# Patient Record
Sex: FEMALE | Race: OTHER | HISPANIC OR LATINO | Employment: PART TIME | URBAN - NONMETROPOLITAN AREA
[De-identification: names, ages, dates, MRNs, and addresses within clinical notes are randomized per-mention and may not be internally consistent; named-entity substitution may affect disease eponyms.]

---

## 2018-01-12 ENCOUNTER — TRANSCRIBE ORDERS (OUTPATIENT)
Dept: ADMINISTRATIVE | Facility: HOSPITAL | Age: 58
End: 2018-01-12

## 2018-01-12 DIAGNOSIS — Z12.31 VISIT FOR SCREENING MAMMOGRAM: Primary | ICD-10-CM

## 2018-01-16 ENCOUNTER — TRANSCRIBE ORDERS (OUTPATIENT)
Dept: ADMINISTRATIVE | Facility: HOSPITAL | Age: 58
End: 2018-01-16

## 2018-01-16 DIAGNOSIS — Z12.39 SCREENING BREAST EXAMINATION: Primary | ICD-10-CM

## 2019-01-19 ENCOUNTER — HOSPITAL ENCOUNTER (EMERGENCY)
Facility: HOSPITAL | Age: 59
Discharge: HOME | End: 2019-01-19
Attending: EMERGENCY MEDICINE | Admitting: NURSE PRACTITIONER

## 2019-01-19 ENCOUNTER — APPOINTMENT (OUTPATIENT)
Dept: GENERAL RADIOLOGY | Facility: HOSPITAL | Age: 59
End: 2019-01-19

## 2019-01-19 VITALS
OXYGEN SATURATION: 95 % | SYSTOLIC BLOOD PRESSURE: 163 MMHG | DIASTOLIC BLOOD PRESSURE: 94 MMHG | WEIGHT: 175 LBS | TEMPERATURE: 98.8 F | HEIGHT: 60 IN | RESPIRATION RATE: 18 BRPM | HEART RATE: 84 BPM | BODY MASS INDEX: 34.36 KG/M2

## 2019-01-19 DIAGNOSIS — S52.571A OTHER CLOSED INTRA-ARTICULAR FRACTURE OF DISTAL END OF RIGHT RADIUS, INITIAL ENCOUNTER: Primary | ICD-10-CM

## 2019-01-19 PROCEDURE — 99283 EMERGENCY DEPT VISIT LOW MDM: CPT

## 2019-01-19 PROCEDURE — 73110 X-RAY EXAM OF WRIST: CPT

## 2019-01-19 PROCEDURE — 25010000002 MORPHINE PER 10 MG: Performed by: EMERGENCY MEDICINE

## 2019-01-19 PROCEDURE — 96372 THER/PROPH/DIAG INJ SC/IM: CPT

## 2019-01-19 PROCEDURE — 73110 X-RAY EXAM OF WRIST: CPT | Performed by: RADIOLOGY

## 2019-01-19 RX ORDER — ONDANSETRON 4 MG/1
4 TABLET, ORALLY DISINTEGRATING ORAL ONCE
Status: COMPLETED | OUTPATIENT
Start: 2019-01-19 | End: 2019-01-19

## 2019-01-19 RX ORDER — HYDROCODONE BITARTRATE AND ACETAMINOPHEN 5; 325 MG/1; MG/1
1 TABLET ORAL ONCE
Status: COMPLETED | OUTPATIENT
Start: 2019-01-19 | End: 2019-01-19

## 2019-01-19 RX ORDER — HYDROCODONE BITARTRATE AND ACETAMINOPHEN 5; 325 MG/1; MG/1
1 TABLET ORAL EVERY 6 HOURS PRN
Qty: 10 TABLET | Refills: 0 | Status: SHIPPED | OUTPATIENT
Start: 2019-01-19 | End: 2019-04-24

## 2019-01-19 RX ADMIN — HYDROCODONE BITARTRATE AND ACETAMINOPHEN 1 TABLET: 5; 325 TABLET ORAL at 23:17

## 2019-01-19 RX ADMIN — ONDANSETRON 4 MG: 4 TABLET, ORALLY DISINTEGRATING ORAL at 22:17

## 2019-01-19 RX ADMIN — MORPHINE SULFATE 4 MG: 4 INJECTION INTRAVENOUS at 22:17

## 2019-01-20 NOTE — ED PROVIDER NOTES
Subjective     History provided by:  Patient   used: No    Fall   Mechanism of injury: fall    Injury location:  Shoulder/arm  Shoulder/arm injury location:  R wrist  [place] airport.  Time since incident:  3 hours  Arrived directly from scene: no    Fall:     Fall occurred: escalator.    Impact surface:  Hard floor    Point of impact:  Hands  Suspicion of alcohol use: no    Suspicion of drug use: no    Tetanus status:  Unknown  Prior to arrival data:     Bystander interventions:  None    Patient ambulatory at scene: yes      Blood loss:  None    Responsiveness at scene:  Alert    Orientation at scene:  Person, place and situation    Loss of consciousness: no      Amnesic to event: no      Airway interventions:  None    Breathing interventions:  None  Associated symptoms: no abdominal pain, no back pain, no difficulty breathing, no headaches, no nausea and no neck pain    Risk factors: no AICD, no anticoagulation therapy, no kidney disease and no past MI        Review of Systems   Constitutional: Negative.    HENT: Negative.    Eyes: Negative.    Respiratory: Negative.    Cardiovascular: Negative.    Gastrointestinal: Negative for abdominal pain and nausea.   Endocrine: Negative.    Genitourinary: Negative.    Musculoskeletal: Negative for back pain and neck pain.   Skin: Negative.    Allergic/Immunologic: Negative.    Neurological: Negative for headaches.   Hematological: Negative.    Psychiatric/Behavioral: Negative.    All other systems reviewed and are negative.      Past Medical History:   Diagnosis Date   • Arthritis    • Carpal tunnel syndrome    • Hypertension    • PONV (postoperative nausea and vomiting)    • Rectal bleeding    • Tendonitis        Allergies   Allergen Reactions   • Lodine [Etodolac]        Past Surgical History:   Procedure Laterality Date   • COLONOSCOPY  2011   • COLONOSCOPY N/A 9/6/2016    Procedure: COLONOSCOPY  CPTCODE: 91021;  Surgeon: Daniel Stephens  Mely CARDENAS MD;  Location: [facility] OR;  Service:    • FOOT SURGERY     • HYSTERECTOMY      43   • KNEE SURGERY         Family History   Problem Relation Age of Onset   • Arthritis Mother    • Hypertension Mother    • Heart disease Father    • Diabetes Father        Social History     Socioeconomic History   • Marital status:      Spouse name: Not on file   • Number of children: Not on file   • Years of education: Not on file   • Highest education level: Not on file   Tobacco Use   • Smoking status: Former Smoker     Last attempt to quit:      Years since quittin.0   • Smokeless tobacco: Never Used   Substance and Sexual Activity   • Alcohol use: No   • Drug use: No   • Sexual activity: Defer           Objective   Physical Exam   Constitutional: She is oriented to person, place, and time. She appears well-developed and well-nourished.   HENT:   Head: Normocephalic.   Eyes: EOM are normal. Pupils are equal, round, and reactive to light.   Neck: Normal range of motion. Neck supple.   Cardiovascular: Normal rate, regular rhythm, normal heart sounds and intact distal pulses.   Pulmonary/Chest: Effort normal and breath sounds normal.   Abdominal: Soft. Bowel sounds are normal.   Musculoskeletal: She exhibits tenderness and deformity.   Deformity right wrist. Tenderness noted throughout wrist. Swelling noted. Distal pulses intact   Neurological: She is alert and oriented to person, place, and time.   Skin: Skin is warm and dry. Capillary refill takes less than 2 seconds.   Psychiatric: She has a normal mood and affect. Her behavior is normal. Judgment and thought content normal.   Nursing note and vitals reviewed.      Procedures           ED Course  ED Course as of 2259   Sat 2019   2251 Intraarticular radius fx with displacement of radial head. XR Wrist 3+ View Right [KK]    [facility] [id number] 0 active morphine equivalent  [KK]      ED Course User Index  [KK] Soni Foster, APRN                   MDM  Number of Diagnoses or Management Options  Other closed intra-articular fracture of distal end of right radius, initial encounter: new and requires workup     Amount and/or Complexity of Data Reviewed  Tests in the radiology section of CPT®: reviewed and ordered    Risk of Complications, Morbidity, and/or Mortality  Presenting problems: moderate  Diagnostic procedures: moderate  Management options: moderate    Patient Progress  Patient progress: improved        Final diagnoses:   Other closed intra-articular fracture of distal end of right radius, initial encounter            Soni Foster, APRN  01/19/19 [id number]

## 2019-01-20 NOTE — ED NOTES
Pt awaiting ride from sister. Sister has been instructed to come inside and sign pt's paperwork  As the .     Cindi Gandhi RN  01/19/19 5243

## 2019-01-21 ENCOUNTER — OFFICE VISIT (OUTPATIENT)
Dept: ORTHOPEDIC SURGERY | Facility: CLINIC | Age: 59
End: 2019-01-21

## 2019-01-21 ENCOUNTER — APPOINTMENT (OUTPATIENT)
Dept: PREADMISSION TESTING | Facility: HOSPITAL | Age: 59
End: 2019-01-21

## 2019-01-21 ENCOUNTER — TELEPHONE (OUTPATIENT)
Dept: ORTHOPEDIC SURGERY | Facility: CLINIC | Age: 59
End: 2019-01-21

## 2019-01-21 VITALS
HEIGHT: 60 IN | BODY MASS INDEX: 34.95 KG/M2 | HEART RATE: 80 BPM | SYSTOLIC BLOOD PRESSURE: 160 MMHG | DIASTOLIC BLOOD PRESSURE: 100 MMHG | WEIGHT: 178 LBS

## 2019-01-21 DIAGNOSIS — S52.351A CLOSED DISPLACED COMMINUTED FRACTURE OF SHAFT OF RIGHT RADIUS, INITIAL ENCOUNTER: ICD-10-CM

## 2019-01-21 DIAGNOSIS — S52.351A CLOSED DISPLACED COMMINUTED FRACTURE OF SHAFT OF RIGHT RADIUS, INITIAL ENCOUNTER: Primary | ICD-10-CM

## 2019-01-21 LAB
ANION GAP SERPL CALCULATED.3IONS-SCNC: 6.7 MMOL/L (ref 3.6–11.2)
BUN BLD-MCNC: 12 MG/DL (ref 7–21)
BUN/CREAT SERPL: 18.2 (ref 7–25)
CALCIUM SPEC-SCNC: 9.8 MG/DL (ref 7.7–10)
CHLORIDE SERPL-SCNC: 104 MMOL/L (ref 99–112)
CO2 SERPL-SCNC: 27.3 MMOL/L (ref 24.3–31.9)
CREAT BLD-MCNC: 0.66 MG/DL (ref 0.43–1.29)
DEPRECATED RDW RBC AUTO: 44.8 FL (ref 37–54)
ERYTHROCYTE [DISTWIDTH] IN BLOOD BY AUTOMATED COUNT: 14.7 % (ref 11.5–14.5)
GFR SERPL CREATININE-BSD FRML MDRD: 112 ML/MIN/1.73
GFR SERPL CREATININE-BSD FRML MDRD: 92 ML/MIN/1.73
GLUCOSE BLD-MCNC: 112 MG/DL (ref 70–110)
HCT VFR BLD AUTO: 41 % (ref 37–47)
HGB BLD-MCNC: 13.9 G/DL (ref 12–16)
MCH RBC QN AUTO: 28.7 PG (ref 27–33)
MCHC RBC AUTO-ENTMCNC: 33.9 G/DL (ref 33–37)
MCV RBC AUTO: 84.7 FL (ref 80–94)
OSMOLALITY SERPL CALC.SUM OF ELEC: 276.2 MOSM/KG (ref 273–305)
PLATELET # BLD AUTO: 204 10*3/MM3 (ref 130–400)
PMV BLD AUTO: 11.8 FL (ref 6–10)
POTASSIUM BLD-SCNC: 3.3 MMOL/L (ref 3.5–5.3)
RBC # BLD AUTO: 4.84 10*6/MM3 (ref 4.2–5.4)
SODIUM BLD-SCNC: 138 MMOL/L (ref 135–153)
WBC NRBC COR # BLD: 5.7 10*3/MM3 (ref 4.5–12.5)

## 2019-01-21 PROCEDURE — 80048 BASIC METABOLIC PNL TOTAL CA: CPT | Performed by: ANESTHESIOLOGY

## 2019-01-21 PROCEDURE — 85027 COMPLETE CBC AUTOMATED: CPT | Performed by: ANESTHESIOLOGY

## 2019-01-21 PROCEDURE — 36415 COLL VENOUS BLD VENIPUNCTURE: CPT

## 2019-01-21 PROCEDURE — 99204 OFFICE O/P NEW MOD 45 MIN: CPT | Performed by: ORTHOPAEDIC SURGERY

## 2019-01-21 RX ORDER — CEFAZOLIN SODIUM 2 G/50ML
2 SOLUTION INTRAVENOUS ONCE
Status: CANCELLED | OUTPATIENT
Start: 2019-01-22 | End: 2019-01-21

## 2019-01-21 NOTE — PROGRESS NOTES
History & Physical      Patient: Bia Mott  YOB: 1960  Date of Encounter: 01/21/2019        Chief Complaint:   Chief Complaint   Patient presents with   • Right Wrist - Pain, Edema       HPI:   Bia Mott, 58 y.o. female, referred by [facility] presents for evaluation of right wrist injury sustained 2 days ago.  [place] airport[place]  She presented to the emergency room and was placed in a splint.  She has had no previous injuries to her right wrist.  She is left-hand dominant.    Active Problem List:  Patient Active Problem List   Diagnosis   • Closed displaced comminuted fracture of shaft of right radius   • Hypertension       Past Medical History:  Past Medical History:   Diagnosis Date   • Arthritis    • Carpal tunnel syndrome    • Hypertension    • Radius distal fracture    • Rectal bleeding    • Tendonitis        Past Surgical History:  Past Surgical History:   Procedure Laterality Date   • COLONOSCOPY  2011   • COLONOSCOPY N/A 9/6/2016    Procedure: COLONOSCOPY  CPTCODE: 08832;  Surgeon: Daniel Boone III, MD;  Location: [facility];  Service:    • FOOT SURGERY     • HYSTERECTOMY      43   • KNEE SURGERY         Family History:  Family History   Problem Relation Age of Onset   • Arthritis Mother    • Hypertension Mother    • Heart disease Father    • Diabetes Father    • Hypertension Father    • Hypertension Sister    • Heart disease Brother    • Hypertension Brother        Social History:  Social History     Socioeconomic History   • Marital status: Legally      Spouse name: Not on file   • Number of children: Not on file   • Years of education: Not on file   • Highest education level: Not on file   Social Needs   • Financial resource strain: Not on file   • Food insecurity - worry: Not on file   • Food insecurity - inability: Not on file   • Transportation needs - medical: Not on file   • Transportation needs - non-medical: Not on  file   Occupational History   • Not on file   Tobacco Use   • Smoking status: Former Smoker     Last attempt to quit: 2015     Years since quittin.0   • Smokeless tobacco: Never Used   Substance and Sexual Activity   • Alcohol use: No   • Drug use: No   • Sexual activity: Defer   Other Topics Concern   • Not on file   Social History Narrative   • Not on file     Patient's Body mass index is 34.76 kg/m². BMI is above normal parameters. Recommendations include: educational material.      Medications:  Current Outpatient Medications   Medication Sig Dispense Refill   • ALLER-EASE 180 MG tablet daily.     • HYDROcodone-acetaminophen (NORCO) 5-325 MG per tablet Take 1 tablet by mouth Every 6 (Six) Hours As Needed for Moderate Pain . 10 tablet 0   • lisinopril-hydrochlorothiazide (PRINZIDE,ZESTORETIC) 10-12.5 MG per tablet daily.     • montelukast (SINGULAIR) 10 MG tablet daily.     • vitamin D (ERGOCALCIFEROL) 50422 UNITS capsule capsule 1 (one) time per week.       No current facility-administered medications for this visit.        Allergies:  Allergies   Allergen Reactions   • Lodine [Etodolac]        Review of Systems:   Review of Systems   Constitutional: Positive for activity change.   HENT: Negative.    Eyes: Negative.    Respiratory: Negative.    Cardiovascular: Negative.    Gastrointestinal: Negative.    Endocrine: Positive for cold intolerance and heat intolerance.   Genitourinary: Negative.    Musculoskeletal: Positive for arthralgias and joint swelling.   Skin: Negative.    Allergic/Immunologic: Negative.    Neurological: Negative.    Hematological: Negative.    Psychiatric/Behavioral: Negative.        Physical Exam:   Physical Exam   Constitutional: She is oriented to person, place, and time. She appears well-developed. No distress.   HENT:   Head: Normocephalic and atraumatic.   Right Ear: External ear normal.   Left Ear: External ear normal.   Nose: Nose normal.   Eyes: Conjunctivae and EOM are normal.  "Right eye exhibits no discharge. Left eye exhibits no discharge.   Neck: Normal range of motion. Neck supple.   Cardiovascular: Normal rate, regular rhythm, normal heart sounds and intact distal pulses.   No murmur heard.  Pulmonary/Chest: Effort normal and breath sounds normal. No respiratory distress. She has no wheezes.   Abdominal: Soft. She exhibits no distension.   Musculoskeletal:   Musculoskeletal: Right wrist evaluation reveals generalized swelling about the distal radius and proximal hand.  She has good pulse, good sensation to her fingers and active motion to all fingers.   Neurological: She is alert and oriented to person, place, and time.   Skin: Skin is warm and dry. Capillary refill takes less than 2 seconds. She is not diaphoretic.   Psychiatric: She has a normal mood and affect. Her behavior is normal. Judgment and thought content normal.     GENERAL: 58 y.o. female, alert and oriented X 3 in no acute distress.   Visit Vitals  /100 (BP Location: Left arm, Patient Position: Sitting)   Pulse 80   Ht 152.4 cm (60\")   Wt 80.7 kg (178 lb)   BMI 34.76 kg/m²       Radiology/Labs:   Radiographs right wrist show fracture the distal radius 100% displaced dorsally.  There is intra-articular component to the fracture nondisplaced.       Assessment & Plan:   58 y.o. female with comminuted intra-articular fracture right distal radius.  We discussed her options which include closed reduction versus open reduction internal fixation with volar plate.  After that discussion she wishes to proceed with surgical fixation of her wrist, she will be scheduled for open reduction internal fixation right distal radius January 22, 2019.  Today she is placed in a fiberglass splint.      ICD-10-CM ICD-9-CM   1. Closed displaced comminuted fracture of shaft of right radius, initial encounter S52.351A 813.21               Cc:   Rubia Brandon MD          Scribed for Cristo Villeda MD by Jane Villeda " RN.10:34 AM 01/21/2019

## 2019-01-22 ENCOUNTER — ANESTHESIA EVENT (OUTPATIENT)
Dept: PERIOP | Facility: HOSPITAL | Age: 59
End: 2019-01-22

## 2019-01-22 ENCOUNTER — ANESTHESIA (OUTPATIENT)
Dept: PERIOP | Facility: HOSPITAL | Age: 59
End: 2019-01-22

## 2019-01-22 ENCOUNTER — HOSPITAL ENCOUNTER (OUTPATIENT)
Facility: HOSPITAL | Age: 59
Setting detail: HOSPITAL OUTPATIENT SURGERY
Discharge: HOME | End: 2019-01-22
Attending: ORTHOPAEDIC SURGERY | Admitting: ORTHOPAEDIC SURGERY

## 2019-01-22 ENCOUNTER — APPOINTMENT (OUTPATIENT)
Dept: GENERAL RADIOLOGY | Facility: HOSPITAL | Age: 59
End: 2019-01-22

## 2019-01-22 ENCOUNTER — PREP FOR SURGERY (OUTPATIENT)
Dept: OTHER | Facility: HOSPITAL | Age: 59
End: 2019-01-22

## 2019-01-22 VITALS
WEIGHT: 178 LBS | OXYGEN SATURATION: 98 % | HEIGHT: 60 IN | TEMPERATURE: 97.2 F | BODY MASS INDEX: 34.95 KG/M2 | RESPIRATION RATE: 15 BRPM | SYSTOLIC BLOOD PRESSURE: 156 MMHG | HEART RATE: 90 BPM | DIASTOLIC BLOOD PRESSURE: 88 MMHG

## 2019-01-22 DIAGNOSIS — S52.351A CLOSED DISPLACED COMMINUTED FRACTURE OF SHAFT OF RIGHT RADIUS, INITIAL ENCOUNTER: ICD-10-CM

## 2019-01-22 PROBLEM — I10 HYPERTENSION: Status: ACTIVE | Noted: 2019-01-22

## 2019-01-22 PROCEDURE — 25010000003 CEFAZOLIN SODIUM-DEXTROSE 2-3 GM-%(50ML) RECONSTITUTED SOLUTION: Performed by: ORTHOPAEDIC SURGERY

## 2019-01-22 PROCEDURE — C1713 ANCHOR/SCREW BN/BN,TIS/BN: HCPCS | Performed by: ORTHOPAEDIC SURGERY

## 2019-01-22 PROCEDURE — 25010000002 PROPOFOL 10 MG/ML EMULSION: Performed by: NURSE ANESTHETIST, CERTIFIED REGISTERED

## 2019-01-22 PROCEDURE — 25010000002 MIDAZOLAM PER 1 MG: Performed by: NURSE ANESTHETIST, CERTIFIED REGISTERED

## 2019-01-22 PROCEDURE — 94799 UNLISTED PULMONARY SVC/PX: CPT

## 2019-01-22 PROCEDURE — 25010000002 FENTANYL CITRATE (PF) 100 MCG/2ML SOLUTION: Performed by: NURSE ANESTHETIST, CERTIFIED REGISTERED

## 2019-01-22 PROCEDURE — 25010000002 PROPOFOL 10 MG/ML EMULSION

## 2019-01-22 PROCEDURE — 25609 OPTX DST RD XART FX/EP SEP3+: CPT | Performed by: ORTHOPAEDIC SURGERY

## 2019-01-22 PROCEDURE — 25010000002 ONDANSETRON PER 1 MG

## 2019-01-22 PROCEDURE — 25010000002 ONDANSETRON PER 1 MG: Performed by: NURSE ANESTHETIST, CERTIFIED REGISTERED

## 2019-01-22 PROCEDURE — 76000 FLUOROSCOPY <1 HR PHYS/QHP: CPT | Performed by: RADIOLOGY

## 2019-01-22 PROCEDURE — 76000 FLUOROSCOPY <1 HR PHYS/QHP: CPT

## 2019-01-22 PROCEDURE — 25010000002 MIDAZOLAM PER 1 MG

## 2019-01-22 PROCEDURE — 25010000002 FENTANYL CITRATE (PF) 100 MCG/2ML SOLUTION

## 2019-01-22 DEVICE — IMPLANTABLE DEVICE: Type: IMPLANTABLE DEVICE | Site: RADIUS | Status: FUNCTIONAL

## 2019-01-22 DEVICE — SCRW LK VA W STRDRV 2.4X18MM: Type: IMPLANTABLE DEVICE | Site: RADIUS | Status: FUNCTIONAL

## 2019-01-22 DEVICE — SCRW CORT S/TAP STRDRV 2.7X12MM: Type: IMPLANTABLE DEVICE | Site: RADIUS | Status: FUNCTIONAL

## 2019-01-22 DEVICE — SCRW LK VA W STRDRV 2.4X20MM: Type: IMPLANTABLE DEVICE | Site: RADIUS | Status: FUNCTIONAL

## 2019-01-22 RX ORDER — SODIUM CHLORIDE 0.9 % (FLUSH) 0.9 %
3 SYRINGE (ML) INJECTION EVERY 12 HOURS SCHEDULED
Status: DISCONTINUED | OUTPATIENT
Start: 2019-01-22 | End: 2019-01-22 | Stop reason: HOSPADM

## 2019-01-22 RX ORDER — OXYCODONE HYDROCHLORIDE AND ACETAMINOPHEN 5; 325 MG/1; MG/1
1 TABLET ORAL ONCE AS NEEDED
Status: COMPLETED | OUTPATIENT
Start: 2019-01-22 | End: 2019-01-22

## 2019-01-22 RX ORDER — OXYCODONE HYDROCHLORIDE AND ACETAMINOPHEN 5; 325 MG/1; MG/1
1-2 TABLET ORAL EVERY 4 HOURS PRN
Qty: 30 TABLET | Refills: 0 | Status: SHIPPED | OUTPATIENT
Start: 2019-01-22 | End: 2019-04-24

## 2019-01-22 RX ORDER — BUPIVACAINE HYDROCHLORIDE 5 MG/ML
INJECTION, SOLUTION PERINEURAL AS NEEDED
Status: DISCONTINUED | OUTPATIENT
Start: 2019-01-22 | End: 2019-01-22 | Stop reason: HOSPADM

## 2019-01-22 RX ORDER — SODIUM CHLORIDE 0.9 % (FLUSH) 0.9 %
3-10 SYRINGE (ML) INJECTION AS NEEDED
Status: DISCONTINUED | OUTPATIENT
Start: 2019-01-22 | End: 2019-01-22 | Stop reason: HOSPADM

## 2019-01-22 RX ORDER — FENTANYL CITRATE 50 UG/ML
50 INJECTION, SOLUTION INTRAMUSCULAR; INTRAVENOUS
Status: COMPLETED | OUTPATIENT
Start: 2019-01-22 | End: 2019-01-22

## 2019-01-22 RX ORDER — ONDANSETRON 2 MG/ML
4 INJECTION INTRAMUSCULAR; INTRAVENOUS ONCE AS NEEDED
Status: DISCONTINUED | OUTPATIENT
Start: 2019-01-22 | End: 2019-01-22 | Stop reason: HOSPADM

## 2019-01-22 RX ORDER — IPRATROPIUM BROMIDE AND ALBUTEROL SULFATE 2.5; .5 MG/3ML; MG/3ML
3 SOLUTION RESPIRATORY (INHALATION) ONCE AS NEEDED
Status: DISCONTINUED | OUTPATIENT
Start: 2019-01-22 | End: 2019-01-22 | Stop reason: HOSPADM

## 2019-01-22 RX ORDER — FAMOTIDINE 10 MG/ML
INJECTION, SOLUTION INTRAVENOUS AS NEEDED
Status: DISCONTINUED | OUTPATIENT
Start: 2019-01-22 | End: 2019-01-22 | Stop reason: SURG

## 2019-01-22 RX ORDER — FENTANYL CITRATE 50 UG/ML
INJECTION, SOLUTION INTRAMUSCULAR; INTRAVENOUS AS NEEDED
Status: DISCONTINUED | OUTPATIENT
Start: 2019-01-22 | End: 2019-01-22 | Stop reason: SURG

## 2019-01-22 RX ORDER — LIDOCAINE HYDROCHLORIDE 20 MG/ML
INJECTION, SOLUTION INFILTRATION; PERINEURAL AS NEEDED
Status: DISCONTINUED | OUTPATIENT
Start: 2019-01-22 | End: 2019-01-22 | Stop reason: SURG

## 2019-01-22 RX ORDER — MEPERIDINE HYDROCHLORIDE 25 MG/ML
12.5 INJECTION INTRAMUSCULAR; INTRAVENOUS; SUBCUTANEOUS
Status: DISCONTINUED | OUTPATIENT
Start: 2019-01-22 | End: 2019-01-22 | Stop reason: HOSPADM

## 2019-01-22 RX ORDER — SODIUM CHLORIDE, SODIUM LACTATE, POTASSIUM CHLORIDE, CALCIUM CHLORIDE 600; 310; 30; 20 MG/100ML; MG/100ML; MG/100ML; MG/100ML
125 INJECTION, SOLUTION INTRAVENOUS CONTINUOUS
Status: DISCONTINUED | OUTPATIENT
Start: 2019-01-22 | End: 2019-01-22 | Stop reason: HOSPADM

## 2019-01-22 RX ORDER — CEFAZOLIN SODIUM 2 G/50ML
2 SOLUTION INTRAVENOUS ONCE
Status: COMPLETED | OUTPATIENT
Start: 2019-01-22 | End: 2019-01-22

## 2019-01-22 RX ORDER — ONDANSETRON 2 MG/ML
INJECTION INTRAMUSCULAR; INTRAVENOUS AS NEEDED
Status: DISCONTINUED | OUTPATIENT
Start: 2019-01-22 | End: 2019-01-22 | Stop reason: SURG

## 2019-01-22 RX ORDER — MIDAZOLAM HYDROCHLORIDE 1 MG/ML
INJECTION INTRAMUSCULAR; INTRAVENOUS AS NEEDED
Status: DISCONTINUED | OUTPATIENT
Start: 2019-01-22 | End: 2019-01-22 | Stop reason: SURG

## 2019-01-22 RX ORDER — PROPOFOL 10 MG/ML
VIAL (ML) INTRAVENOUS AS NEEDED
Status: DISCONTINUED | OUTPATIENT
Start: 2019-01-22 | End: 2019-01-22 | Stop reason: SURG

## 2019-01-22 RX ADMIN — CEFAZOLIN SODIUM 2 G: 2 SOLUTION INTRAVENOUS at 10:27

## 2019-01-22 RX ADMIN — EPHEDRINE SULFATE 10 MG: 50 INJECTION INTRAMUSCULAR; INTRAVENOUS; SUBCUTANEOUS at 11:26

## 2019-01-22 RX ADMIN — FENTANYL CITRATE 50 MCG: 50 INJECTION INTRAMUSCULAR; INTRAVENOUS at 12:08

## 2019-01-22 RX ADMIN — FENTANYL CITRATE 50 MCG: 50 INJECTION INTRAMUSCULAR; INTRAVENOUS at 10:32

## 2019-01-22 RX ADMIN — EPHEDRINE SULFATE 5 MG: 50 INJECTION INTRAMUSCULAR; INTRAVENOUS; SUBCUTANEOUS at 11:31

## 2019-01-22 RX ADMIN — FAMOTIDINE 20 MG: 10 INJECTION, SOLUTION INTRAVENOUS at 10:27

## 2019-01-22 RX ADMIN — OXYCODONE HYDROCHLORIDE AND ACETAMINOPHEN 1 TABLET: 5; 325 TABLET ORAL at 12:43

## 2019-01-22 RX ADMIN — LIDOCAINE HYDROCHLORIDE 30 MG: 20 INJECTION, SOLUTION INFILTRATION; PERINEURAL at 10:32

## 2019-01-22 RX ADMIN — EPHEDRINE SULFATE 10 MG: 50 INJECTION INTRAMUSCULAR; INTRAVENOUS; SUBCUTANEOUS at 10:40

## 2019-01-22 RX ADMIN — FENTANYL CITRATE 50 MCG: 50 INJECTION INTRAMUSCULAR; INTRAVENOUS at 10:40

## 2019-01-22 RX ADMIN — PROPOFOL 200 MG: 10 INJECTION, EMULSION INTRAVENOUS at 10:32

## 2019-01-22 RX ADMIN — SODIUM CHLORIDE, POTASSIUM CHLORIDE, SODIUM LACTATE AND CALCIUM CHLORIDE 125 ML/HR: 600; 310; 30; 20 INJECTION, SOLUTION INTRAVENOUS at 09:12

## 2019-01-22 RX ADMIN — MEPERIDINE HYDROCHLORIDE 12.5 MG: 25 INJECTION INTRAMUSCULAR; INTRAVENOUS; SUBCUTANEOUS at 12:23

## 2019-01-22 RX ADMIN — FENTANYL CITRATE 50 MCG: 50 INJECTION INTRAMUSCULAR; INTRAVENOUS at 12:13

## 2019-01-22 RX ADMIN — FENTANYL CITRATE 50 MCG: 50 INJECTION INTRAMUSCULAR; INTRAVENOUS at 11:52

## 2019-01-22 RX ADMIN — ONDANSETRON 4 MG: 2 INJECTION, SOLUTION INTRAMUSCULAR; INTRAVENOUS at 10:37

## 2019-01-22 RX ADMIN — MIDAZOLAM HYDROCHLORIDE 2 MG: 1 INJECTION, SOLUTION INTRAMUSCULAR; INTRAVENOUS at 10:27

## 2019-01-22 RX ADMIN — FENTANYL CITRATE 50 MCG: 50 INJECTION INTRAMUSCULAR; INTRAVENOUS at 10:59

## 2019-01-22 NOTE — ANESTHESIA POSTPROCEDURE EVALUATION
Patient: Bia Mott    Procedure Summary     Date:  01/22/19 Room / Location:  [facility] OR 03 /  [facility] OR    Anesthesia Start:  1027 Anesthesia Stop:  1150    Procedure:  OPEN REDUCTION INTERNAL FIXATION DISTAL RADIUS (Right Hand) Diagnosis:       Closed displaced comminuted fracture of shaft of right radius, initial encounter      (Closed displaced comminuted fracture of shaft of right radius, initial encounter [S52.351A])    Surgeon:  Cristo Villeda MD Provider:  Avelino Howard MD    Anesthesia Type:  general ASA Status:  2          Anesthesia Type: general  Last vitals  BP   170/95 (01/22/19 0846)   Temp   98.4 °F (36.9 °C) (01/22/19 0846)   Pulse   84 (01/22/19 0846)   Resp   18 (01/22/19 0846)     SpO2   94 % (01/22/19 0846)     Post Anesthesia Care and Evaluation    Patient location during evaluation: PHASE II  Patient participation: complete - patient participated  Level of consciousness: awake and alert  Pain score: 1  Pain management: adequate  Airway patency: patent  Anesthetic complications: No anesthetic complications  PONV Status: controlled  Cardiovascular status: acceptable  Respiratory status: acceptable  Hydration status: acceptable

## 2019-01-22 NOTE — ANESTHESIA PREPROCEDURE EVALUATION
Anesthesia Evaluation     Patient summary reviewed and Nursing notes reviewed   no history of anesthetic complications:  NPO Solid Status: > 8 hours  NPO Liquid Status: > 8 hours           Airway   Mallampati: II  TM distance: >3 FB  Neck ROM: full  no difficulty expected  Dental - normal exam     Pulmonary - normal exam   (+) a smoker,   (-) asthma  Cardiovascular - normal exam  Exercise tolerance: good (4-7 METS)    NYHA Classification: II    (+) hypertension,   (-) past MI, dysrhythmias, angina, CHF      Neuro/Psych  (+) numbness, psychiatric history Depression and Anxiety,     (-) seizures, CVA  GI/Hepatic/Renal/Endo    (-) liver disease, no renal disease, diabetes, hypothyroidism, GI bleed    Musculoskeletal     Abdominal  - normal exam    Bowel sounds: normal.   Substance History   (+) alcohol use,   (-) drug use     OB/GYN negative ob/gyn ROS         Other   (+) arthritis                       Anesthesia Plan    ASA 2     general     intravenous induction   Anesthetic plan, all risks, benefits, and alternatives have been provided, discussed and informed consent has been obtained with: patient.  Use of blood products discussed with patient  Consented to blood products.

## 2019-01-22 NOTE — H&P
History & Physical      Patient: Bia Mott  YOB: 1960  Date of Encounter: 01/21/2019        Chief Complaint:   Chief Complaint   Patient presents with   • Right Wrist - Pain, Edema       HPI:   Bia Mott, 58 y.o. female, referred by [facility] presents for evaluation of right wrist injury sustained 2 days ago.  [place]  She presented to the emergency room and was placed in a splint.  She has had no previous injuries to her right wrist.  She is left-hand dominant.    Active Problem List:  Patient Active Problem List   Diagnosis   • Closed displaced comminuted fracture of shaft of right radius   • Hypertension       Past Medical History:  Past Medical History:   Diagnosis Date   • Arthritis    • Carpal tunnel syndrome    • Hypertension    • Radius distal fracture    • Rectal bleeding    • Tendonitis        Past Surgical History:  Past Surgical History:   Procedure Laterality Date   • COLONOSCOPY  2011   • COLONOSCOPY N/A 9/6/2016    Procedure: COLONOSCOPY  CPTCODE: 66400;  Surgeon: Daniel Boone III, MD;  Location: [facility];  Service:    • FOOT SURGERY     • HYSTERECTOMY      43   • KNEE SURGERY         Family History:  Family History   Problem Relation Age of Onset   • Arthritis Mother    • Hypertension Mother    • Heart disease Father    • Diabetes Father    • Hypertension Father    • Hypertension Sister    • Heart disease Brother    • Hypertension Brother        Social History:  Social History     Socioeconomic History   • Marital status: Legally      Spouse name: Not on file   • Number of children: Not on file   • Years of education: Not on file   • Highest education level: Not on file   Social Needs   • Financial resource strain: Not on file   • Food insecurity - worry: Not on file   • Food insecurity - inability: Not on file   • Transportation needs - medical: Not on file   • Transportation needs - non-medical: Not on  file   Occupational History   • Not on file   Tobacco Use   • Smoking status: Former Smoker     Last attempt to quit: 2015     Years since quittin.0   • Smokeless tobacco: Never Used   Substance and Sexual Activity   • Alcohol use: No   • Drug use: No   • Sexual activity: Defer   Other Topics Concern   • Not on file   Social History Narrative   • Not on file     Patient's Body mass index is 34.76 kg/m². BMI is above normal parameters. Recommendations include: educational material.      Medications:  Current Outpatient Medications   Medication Sig Dispense Refill   • ALLER-EASE 180 MG tablet daily.     • HYDROcodone-acetaminophen (NORCO) 5-325 MG per tablet Take 1 tablet by mouth Every 6 (Six) Hours As Needed for Moderate Pain . 10 tablet 0   • lisinopril-hydrochlorothiazide (PRINZIDE,ZESTORETIC) 10-12.5 MG per tablet daily.     • montelukast (SINGULAIR) 10 MG tablet daily.     • vitamin D (ERGOCALCIFEROL) 25692 UNITS capsule capsule 1 (one) time per week.       No current facility-administered medications for this visit.        Allergies:  Allergies   Allergen Reactions   • Lodine [Etodolac]        Review of Systems:   Review of Systems   Constitutional: Positive for activity change.   HENT: Negative.    Eyes: Negative.    Respiratory: Negative.    Cardiovascular: Negative.    Gastrointestinal: Negative.    Endocrine: Positive for cold intolerance and heat intolerance.   Genitourinary: Negative.    Musculoskeletal: Positive for arthralgias and joint swelling.   Skin: Negative.    Allergic/Immunologic: Negative.    Neurological: Negative.    Hematological: Negative.    Psychiatric/Behavioral: Negative.        Physical Exam:   Physical Exam   Constitutional: She is oriented to person, place, and time. She appears well-developed. No distress.   HENT:   Head: Normocephalic and atraumatic.   Right Ear: External ear normal.   Left Ear: External ear normal.   Nose: Nose normal.   Eyes: Conjunctivae and EOM are normal.  "Right eye exhibits no discharge. Left eye exhibits no discharge.   Neck: Normal range of motion. Neck supple.   Cardiovascular: Normal rate, regular rhythm, normal heart sounds and intact distal pulses.   No murmur heard.  Pulmonary/Chest: Effort normal and breath sounds normal. No respiratory distress. She has no wheezes.   Abdominal: Soft. She exhibits no distension.   Musculoskeletal:   Musculoskeletal: Right wrist evaluation reveals generalized swelling about the distal radius and proximal hand.  She has good pulse, good sensation to her fingers and active motion to all fingers.   Neurological: She is alert and oriented to person, place, and time.   Skin: Skin is warm and dry. Capillary refill takes less than 2 seconds. She is not diaphoretic.   Psychiatric: She has a normal mood and affect. Her behavior is normal. Judgment and thought content normal.     GENERAL: 58 y.o. female, alert and oriented X 3 in no acute distress.   Visit Vitals  /100 (BP Location: Left arm, Patient Position: Sitting)   Pulse 80   Ht 152.4 cm (60\")   Wt 80.7 kg (178 lb)   BMI 34.76 kg/m²       Radiology/Labs:   Radiographs right wrist show fracture the distal radius 100% displaced dorsally.  There is intra-articular component to the fracture nondisplaced.       Assessment & Plan:   58 y.o. female with comminuted intra-articular fracture right distal radius.  We discussed her options which include closed reduction versus open reduction internal fixation with volar plate.  After that discussion she wishes to proceed with surgical fixation of her wrist, she will be scheduled for open reduction internal fixation right distal radius January 22, 2019.  Today she is placed in a fiberglass splint.      ICD-10-CM ICD-9-CM   1. Closed displaced comminuted fracture of shaft of right radius, initial encounter S52.351A 813.21               Cc:   Rubia Brandon MD          Scribed for Cristo Villeda MD by Jane Villeda " RN.10:34 AM 01/21/2019

## 2019-01-22 NOTE — OP NOTE
ULNA/RADIUS OPEN REDUCTION INTERNAL FIXATION  Procedure Note    Bia Mott  1/22/2019    Pre-op Diagnosis:   Displaced comminuted intra-articular fracture right distal radius    Post-op Diagnosis:     Post-Op Diagnosis Codes:     Same    Procedure(s):  OPEN REDUCTION INTERNAL FIXATION RIGHT  DISTAL RADIUS    Surgeon(s):  Cristo Villeda MD    Anesthesia: General/local Operative technique: With patient in the operating theatre under general anesthetic with the tourniquet applied to the right upper arm right upper extremity sterilely prepped and draped in the usual manner.  2 g of Ancef were given immediately preoperatively.  Right upper extremity was exsanguinated tourniquet inflated to 200 mmHg.  Last incision made in line with the flexor carpi radialis tendon.  The sheath was opened and the FCR tendon retracted ulnarly and lightly dissecting the anterior aspect the distal radius exposed.  The fracture was reduced with traction and manipulation and ends with finger trap traction applied 7 pounds and the fracture held in a reduced position plate was secured to the anterior aspect of the distal radius image intensifier confirmed acceptable position.  Locking screws were placed distally.  Proximally an additional screw was placed through the plate.  reduction was anatomic and confirmed with C-arm image.  The tourniquet was deflated hemostasis obtained the wound closed in layers with 3-0 nylon vertical mattress suture for final closure volar splint was applied she was taken to recovery room in stable condition.    Staff:   Circulator: Hayden Brannon RN  Radiology Technologist: Minor Mckeon  Scrub Person: David Leija  Documenter: Derick Logan RN  Assistant: Joseluis Colon    Estimated Blood Loss: None    Specimens:   none             * No orders in the log *    Implants/Grafts: Synthes locking distal radius plate      Drains: None       Complications: none    Tourniquet time:   37 min    Cristo Villeda MD     Date: 1/22/2019  Time: 11:41 AM    Cc: Rubia Brandon MD

## 2019-01-22 NOTE — H&P (VIEW-ONLY)
History & Physical      Patient: Bia Mott  YOB: 1960  Date of Encounter: 01/21/2019        Chief Complaint:   Chief Complaint   Patient presents with   • Right Wrist - Pain, Edema       HPI:   Bia Mott, 58 y.o. female, referred by [facility] presents for evaluation of right wrist injury sustained 2 days ago.  [place]  She presented to the emergency room and was placed in a splint.  She has had no previous injuries to her right wrist.  She is left-hand dominant.    Active Problem List:  Patient Active Problem List   Diagnosis   • Closed displaced comminuted fracture of shaft of right radius   • Hypertension       Past Medical History:  Past Medical History:   Diagnosis Date   • Arthritis    • Carpal tunnel syndrome    • Hypertension    • Radius distal fracture    • Rectal bleeding    • Tendonitis        Past Surgical History:  Past Surgical History:   Procedure Laterality Date   • COLONOSCOPY  2011   • COLONOSCOPY N/A 9/6/2016    Procedure: COLONOSCOPY  CPTCODE: 29792;  Surgeon: Daniel Boone III, MD;  Location: [facility];  Service:    • FOOT SURGERY     • HYSTERECTOMY      43   • KNEE SURGERY         Family History:  Family History   Problem Relation Age of Onset   • Arthritis Mother    • Hypertension Mother    • Heart disease Father    • Diabetes Father    • Hypertension Father    • Hypertension Sister    • Heart disease Brother    • Hypertension Brother        Social History:  Social History     Socioeconomic History   • Marital status: Legally      Spouse name: Not on file   • Number of children: Not on file   • Years of education: Not on file   • Highest education level: Not on file   Social Needs   • Financial resource strain: Not on file   • Food insecurity - worry: Not on file   • Food insecurity - inability: Not on file   • Transportation needs - medical: Not on file   • Transportation needs - non-medical: Not on  file   Occupational History   • Not on file   Tobacco Use   • Smoking status: Former Smoker     Last attempt to quit: 2015     Years since quittin.0   • Smokeless tobacco: Never Used   Substance and Sexual Activity   • Alcohol use: No   • Drug use: No   • Sexual activity: Defer   Other Topics Concern   • Not on file   Social History Narrative   • Not on file     Patient's Body mass index is 34.76 kg/m². BMI is above normal parameters. Recommendations include: educational material.      Medications:  Current Outpatient Medications   Medication Sig Dispense Refill   • ALLER-EASE 180 MG tablet daily.     • HYDROcodone-acetaminophen (NORCO) 5-325 MG per tablet Take 1 tablet by mouth Every 6 (Six) Hours As Needed for Moderate Pain . 10 tablet 0   • lisinopril-hydrochlorothiazide (PRINZIDE,ZESTORETIC) 10-12.5 MG per tablet daily.     • montelukast (SINGULAIR) 10 MG tablet daily.     • vitamin D (ERGOCALCIFEROL) 16862 UNITS capsule capsule 1 (one) time per week.       No current facility-administered medications for this visit.        Allergies:  Allergies   Allergen Reactions   • Lodine [Etodolac]        Review of Systems:   Review of Systems   Constitutional: Positive for activity change.   HENT: Negative.    Eyes: Negative.    Respiratory: Negative.    Cardiovascular: Negative.    Gastrointestinal: Negative.    Endocrine: Positive for cold intolerance and heat intolerance.   Genitourinary: Negative.    Musculoskeletal: Positive for arthralgias and joint swelling.   Skin: Negative.    Allergic/Immunologic: Negative.    Neurological: Negative.    Hematological: Negative.    Psychiatric/Behavioral: Negative.        Physical Exam:   Physical Exam   Constitutional: She is oriented to person, place, and time. She appears well-developed. No distress.   HENT:   Head: Normocephalic and atraumatic.   Right Ear: External ear normal.   Left Ear: External ear normal.   Nose: Nose normal.   Eyes: Conjunctivae and EOM are normal.  "Right eye exhibits no discharge. Left eye exhibits no discharge.   Neck: Normal range of motion. Neck supple.   Cardiovascular: Normal rate, regular rhythm, normal heart sounds and intact distal pulses.   No murmur heard.  Pulmonary/Chest: Effort normal and breath sounds normal. No respiratory distress. She has no wheezes.   Abdominal: Soft. She exhibits no distension.   Musculoskeletal:   Musculoskeletal: Right wrist evaluation reveals generalized swelling about the distal radius and proximal hand.  She has good pulse, good sensation to her fingers and active motion to all fingers.   Neurological: She is alert and oriented to person, place, and time.   Skin: Skin is warm and dry. Capillary refill takes less than 2 seconds. She is not diaphoretic.   Psychiatric: She has a normal mood and affect. Her behavior is normal. Judgment and thought content normal.     GENERAL: 58 y.o. female, alert and oriented X 3 in no acute distress.   Visit Vitals  /100 (BP Location: Left arm, Patient Position: Sitting)   Pulse 80   Ht 152.4 cm (60\")   Wt 80.7 kg (178 lb)   BMI 34.76 kg/m²       Radiology/Labs:   Radiographs right wrist show fracture the distal radius 100% displaced dorsally.  There is intra-articular component to the fracture nondisplaced.       Assessment & Plan:   58 y.o. female with comminuted intra-articular fracture right distal radius.  We discussed her options which include closed reduction versus open reduction internal fixation with volar plate.  After that discussion she wishes to proceed with surgical fixation of her wrist, she will be scheduled for open reduction internal fixation right distal radius January 22, 2019.  Today she is placed in a fiberglass splint.      ICD-10-CM ICD-9-CM   1. Closed displaced comminuted fracture of shaft of right radius, initial encounter S52.351A 813.21               Cc:   Rubia Brandon MD          Scribed for Cristo Villeda MD by Jane Villeda " RN.10:34 AM 01/21/2019

## 2019-01-28 DIAGNOSIS — S52.351S: Primary | ICD-10-CM

## 2019-01-30 ENCOUNTER — HOSPITAL ENCOUNTER (OUTPATIENT)
Dept: GENERAL RADIOLOGY | Facility: HOSPITAL | Age: 59
Discharge: HOME | End: 2019-01-30
Attending: ORTHOPAEDIC SURGERY | Admitting: ORTHOPAEDIC SURGERY

## 2019-01-30 ENCOUNTER — OFFICE VISIT (OUTPATIENT)
Dept: ORTHOPEDIC SURGERY | Facility: CLINIC | Age: 59
End: 2019-01-30

## 2019-01-30 VITALS — BODY MASS INDEX: 34.93 KG/M2 | HEIGHT: 60 IN | WEIGHT: 177.91 LBS

## 2019-01-30 DIAGNOSIS — S52.351D CLOSED DISPLACED COMMINUTED FRACTURE OF SHAFT OF RIGHT RADIUS WITH ROUTINE HEALING, SUBSEQUENT ENCOUNTER: ICD-10-CM

## 2019-01-30 DIAGNOSIS — S52.351S: ICD-10-CM

## 2019-01-30 DIAGNOSIS — Z98.890 S/P ORIF (OPEN REDUCTION INTERNAL FIXATION) FRACTURE: ICD-10-CM

## 2019-01-30 DIAGNOSIS — Z87.81 S/P ORIF (OPEN REDUCTION INTERNAL FIXATION) FRACTURE: ICD-10-CM

## 2019-01-30 DIAGNOSIS — Z09 POSTOP CHECK: Primary | ICD-10-CM

## 2019-01-30 PROCEDURE — 99024 POSTOP FOLLOW-UP VISIT: CPT | Performed by: ORTHOPAEDIC SURGERY

## 2019-01-30 PROCEDURE — 73110 X-RAY EXAM OF WRIST: CPT | Performed by: RADIOLOGY

## 2019-01-30 PROCEDURE — 73110 X-RAY EXAM OF WRIST: CPT

## 2019-03-12 DIAGNOSIS — S52.351D CLOSED DISPLACED COMMINUTED FRACTURE OF SHAFT OF RIGHT RADIUS WITH ROUTINE HEALING, SUBSEQUENT ENCOUNTER: Primary | ICD-10-CM

## 2019-03-13 ENCOUNTER — HOSPITAL ENCOUNTER (OUTPATIENT)
Dept: GENERAL RADIOLOGY | Facility: HOSPITAL | Age: 59
Discharge: HOME | End: 2019-03-13
Admitting: ORTHOPAEDIC SURGERY

## 2019-03-13 ENCOUNTER — OFFICE VISIT (OUTPATIENT)
Dept: ORTHOPEDIC SURGERY | Facility: CLINIC | Age: 59
End: 2019-03-13

## 2019-03-13 VITALS — BODY MASS INDEX: 34.93 KG/M2 | WEIGHT: 177.91 LBS | HEIGHT: 60 IN

## 2019-03-13 DIAGNOSIS — S52.351D CLOSED DISPLACED COMMINUTED FRACTURE OF SHAFT OF RIGHT RADIUS WITH ROUTINE HEALING, SUBSEQUENT ENCOUNTER: ICD-10-CM

## 2019-03-13 DIAGNOSIS — S52.351S: Primary | ICD-10-CM

## 2019-03-13 PROCEDURE — 73110 X-RAY EXAM OF WRIST: CPT | Performed by: RADIOLOGY

## 2019-03-13 PROCEDURE — 99024 POSTOP FOLLOW-UP VISIT: CPT | Performed by: ORTHOPAEDIC SURGERY

## 2019-03-13 PROCEDURE — 73110 X-RAY EXAM OF WRIST: CPT

## 2019-03-13 NOTE — PROGRESS NOTES
Follow-up Visit         Patient: Bia Mott  YOB: 1960  Date of Encounter: 2019      Chief  Complaint:   Chief Complaint   Patient presents with   • Right Wrist - Post-op, Follow-up         HPI:  Bia Mott, 58 y.o. female returns in follow-up of open reduction internal fixation right distal radius doing well. She is back to work light duty.    Medical History:  Patient Active Problem List   Diagnosis   • Closed displaced comminuted fracture of shaft of right radius   • Hypertension     Past Medical History:   Diagnosis Date   • Anxiety    • Arthritis    • Carpal tunnel syndrome    • Depression    • Hypertension    • Radius distal fracture    • Rectal bleeding    • Tendonitis        Social History:  Social History     Socioeconomic History   • Marital status: Legally      Spouse name: Not on file   • Number of children: Not on file   • Years of education: Not on file   • Highest education level: Not on file   Social Needs   • Financial resource strain: Not on file   • Food insecurity - worry: Not on file   • Food insecurity - inability: Not on file   • Transportation needs - medical: Not on file   • Transportation needs - non-medical: Not on file   Occupational History   • Not on file   Tobacco Use   • Smoking status: Former Smoker     Packs/day: 0.25     Years: 10.00     Pack years: 2.50     Types: Cigarettes     Last attempt to quit: 2015     Years since quittin.2   • Smokeless tobacco: Never Used   Substance and Sexual Activity   • Alcohol use: Yes     Comment: socially   • Drug use: No   • Sexual activity: Defer   Other Topics Concern   • Not on file   Social History Narrative   • Not on file       Surgical History:  Past Surgical History:   Procedure Laterality Date   • COLONOSCOPY     • COLONOSCOPY N/A 2016    Procedure: COLONOSCOPY  CPTCODE: 58062;  Surgeon: Daniel Boone III, MD;  Location: [facility];  Service:    • FOOT SURGERY     •  HYSTERECTOMY      43   • KNEE SURGERY     • ORIF ULNA/RADIUS FRACTURES Right 1/22/2019    Procedure: OPEN REDUCTION INTERNAL FIXATION DISTAL RADIUS;  Surgeon: Cristo Villeda MD;  Location: [facility];  Service: Orthopedics       Radiology:   Radiographs right wrist show acceptably aligned distal radius fracture with plate and screws in good position.      Examination:   Right wrist evaluation shows no significant swelling, intact incision, full mobility normal, neurovascular status.      Assessment & Plan:   58 y.o. female doing well following ORIF right distal radius.  She is to continue working and we will increase her work restrictions to 10 pounds lifting. She will follow-up in 6 weeks, repeat x-rays right wrist upon return.         Diagnosis Plan   1. Closed displaced comminuted fracture of shaft of right radius, sequela               Cc:  Rubia Brandon MD      Scribed for Cristo Villeda MD by Jane Villeda RN.10:53 AM 03/13/2019

## 2019-04-23 DIAGNOSIS — S52.351S: Primary | ICD-10-CM

## 2019-04-24 ENCOUNTER — OFFICE VISIT (OUTPATIENT)
Dept: ORTHOPEDIC SURGERY | Facility: CLINIC | Age: 59
End: 2019-04-24

## 2019-04-24 ENCOUNTER — HOSPITAL ENCOUNTER (OUTPATIENT)
Dept: GENERAL RADIOLOGY | Facility: HOSPITAL | Age: 59
Discharge: HOME | End: 2019-04-24
Admitting: ORTHOPAEDIC SURGERY

## 2019-04-24 VITALS — HEIGHT: 60 IN | BODY MASS INDEX: 34.93 KG/M2 | WEIGHT: 177.91 LBS

## 2019-04-24 DIAGNOSIS — S52.351S: Primary | ICD-10-CM

## 2019-04-24 DIAGNOSIS — S52.351S: ICD-10-CM

## 2019-04-24 PROCEDURE — 73110 X-RAY EXAM OF WRIST: CPT | Performed by: RADIOLOGY

## 2019-04-24 PROCEDURE — 99212 OFFICE O/P EST SF 10 MIN: CPT | Performed by: ORTHOPAEDIC SURGERY

## 2019-04-24 PROCEDURE — 73110 X-RAY EXAM OF WRIST: CPT

## 2019-04-24 NOTE — PROGRESS NOTES
Follow-up Visit         Patient: Bia Mott  YOB: 1960  Date of Encounter: 2019      Chief  Complaint:   Chief Complaint   Patient presents with   • Right Wrist - Post-op, Follow-up     19 (92 days out) Cristo Villeda MD     OPEN REDUCTION INTERNAL FIXATION DISTAL RADIUS - Right             HPI:  Bia Mott, 58 y.o. female returns in follow-up with right distal radius fracture treated surgically on  she is doing well.  Her pain is minimal.  She continues to wear her brace.  She is working.    Medical History:  Patient Active Problem List   Diagnosis   • Closed displaced comminuted fracture of shaft of right radius   • Hypertension     Past Medical History:   Diagnosis Date   • Anxiety    • Arthritis    • Carpal tunnel syndrome    • Depression    • Hypertension    • Radius distal fracture    • Rectal bleeding    • Tendonitis        Social History:  Social History     Socioeconomic History   • Marital status: Legally      Spouse name: Not on file   • Number of children: Not on file   • Years of education: Not on file   • Highest education level: Not on file   Tobacco Use   • Smoking status: Former Smoker     Packs/day: 0.25     Years: 10.00     Pack years: 2.50     Types: Cigarettes     Last attempt to quit:      Years since quittin.3   • Smokeless tobacco: Never Used   Substance and Sexual Activity   • Alcohol use: Yes     Comment: socially   • Drug use: No   • Sexual activity: Defer       Surgical History:  Past Surgical History:   Procedure Laterality Date   • COLONOSCOPY     • COLONOSCOPY N/A 2016    Procedure: COLONOSCOPY  CPTCODE: 21073;  Surgeon: Daniel Boone III, MD;  Location: [facility];  Service:    • FOOT SURGERY     • HYSTERECTOMY      43   • KNEE SURGERY     • ORIF ULNA/RADIUS FRACTURES Right 2019    Procedure: OPEN REDUCTION INTERNAL FIXATION DISTAL RADIUS;  Surgeon: Cristo Villeda MD;   Location: [facility];  Service: Orthopedics       Radiology:   Right wrist AP lateral oblique shows distal radius fracture secured with volar plate and screws good position and alignment with fracture consolidated.      Examination:   Right hand evaluation reveals full mobility, incision is intact, neurovascular grossly intact.      Assessment & Plan:   58 y.o. female well with her right distal radius fracture after surgery she is activity as tolerated allowed to discontinue her brace and she will follow-up in the future as needed.       Diagnosis Plan   1. Closed displaced comminuted fracture of shaft of right radius, sequela               Cc:  Rubia Brandon MD            This document has been electronically signed by Cristo Villeda MD   April 24, 2019 11:01 AM

## 2019-07-01 ENCOUNTER — APPOINTMENT (OUTPATIENT)
Dept: CT IMAGING | Facility: HOSPITAL | Age: 59
End: 2019-07-01

## 2019-07-01 ENCOUNTER — HOSPITAL ENCOUNTER (EMERGENCY)
Facility: HOSPITAL | Age: 59
Discharge: HOME | End: 2019-07-01
Attending: EMERGENCY MEDICINE | Admitting: EMERGENCY MEDICINE

## 2019-07-01 ENCOUNTER — APPOINTMENT (OUTPATIENT)
Dept: GENERAL RADIOLOGY | Facility: HOSPITAL | Age: 59
End: 2019-07-01

## 2019-07-01 VITALS
OXYGEN SATURATION: 99 % | HEART RATE: 80 BPM | TEMPERATURE: 98 F | RESPIRATION RATE: 16 BRPM | WEIGHT: 180 LBS | SYSTOLIC BLOOD PRESSURE: 128 MMHG | BODY MASS INDEX: 35.34 KG/M2 | HEIGHT: 60 IN | DIASTOLIC BLOOD PRESSURE: 74 MMHG

## 2019-07-01 DIAGNOSIS — R07.89 ATYPICAL CHEST PAIN: Primary | ICD-10-CM

## 2019-07-01 LAB
ALBUMIN SERPL-MCNC: 4.5 G/DL (ref 3.5–5.2)
ALBUMIN/GLOB SERPL: 1.5 G/DL
ALP SERPL-CCNC: 115 U/L (ref 39–117)
ALT SERPL W P-5'-P-CCNC: 16 U/L (ref 1–33)
ANION GAP SERPL CALCULATED.3IONS-SCNC: 11.3 MMOL/L (ref 5–15)
AST SERPL-CCNC: 20 U/L (ref 1–32)
BASOPHILS # BLD AUTO: 0.02 10*3/MM3 (ref 0–0.2)
BASOPHILS NFR BLD AUTO: 0.3 % (ref 0–1.5)
BILIRUB SERPL-MCNC: 0.6 MG/DL (ref 0.2–1.2)
BUN BLD-MCNC: 12 MG/DL (ref 6–20)
BUN/CREAT SERPL: 18.5 (ref 7–25)
CALCIUM SPEC-SCNC: 9.7 MG/DL (ref 8.6–10.5)
CHLORIDE SERPL-SCNC: 102 MMOL/L (ref 98–107)
CO2 SERPL-SCNC: 26.7 MMOL/L (ref 22–29)
CREAT BLD-MCNC: 0.65 MG/DL (ref 0.57–1)
D DIMER PPP FEU-MCNC: 1.25 MCGFEU/ML (ref 0–0.5)
DEPRECATED RDW RBC AUTO: 44.1 FL (ref 37–54)
EOSINOPHIL # BLD AUTO: 0.12 10*3/MM3 (ref 0–0.4)
EOSINOPHIL NFR BLD AUTO: 2 % (ref 0.3–6.2)
ERYTHROCYTE [DISTWIDTH] IN BLOOD BY AUTOMATED COUNT: 14.2 % (ref 12.3–15.4)
GFR SERPL CREATININE-BSD FRML MDRD: 114 ML/MIN/1.73
GFR SERPL CREATININE-BSD FRML MDRD: 94 ML/MIN/1.73
GLOBULIN UR ELPH-MCNC: 3.1 GM/DL
GLUCOSE BLD-MCNC: 109 MG/DL (ref 65–99)
HCT VFR BLD AUTO: 42 % (ref 34–46.6)
HGB BLD-MCNC: 14.1 G/DL (ref 12–15.9)
HOLD SPECIMEN: NORMAL
HOLD SPECIMEN: NORMAL
IMM GRANULOCYTES # BLD AUTO: 0.01 10*3/MM3 (ref 0–0.05)
IMM GRANULOCYTES NFR BLD AUTO: 0.2 % (ref 0–0.5)
LYMPHOCYTES # BLD AUTO: 1.92 10*3/MM3 (ref 0.7–3.1)
LYMPHOCYTES NFR BLD AUTO: 32.8 % (ref 19.6–45.3)
MCH RBC QN AUTO: 29 PG (ref 26.6–33)
MCHC RBC AUTO-ENTMCNC: 33.6 G/DL (ref 31.5–35.7)
MCV RBC AUTO: 86.2 FL (ref 79–97)
MONOCYTES # BLD AUTO: 0.41 10*3/MM3 (ref 0.1–0.9)
MONOCYTES NFR BLD AUTO: 7 % (ref 5–12)
NEUTROPHILS # BLD AUTO: 3.38 10*3/MM3 (ref 1.7–7)
NEUTROPHILS NFR BLD AUTO: 57.7 % (ref 42.7–76)
NT-PROBNP SERPL-MCNC: 649.6 PG/ML (ref 5–900)
PLATELET # BLD AUTO: 206 10*3/MM3 (ref 140–450)
PMV BLD AUTO: 11.4 FL (ref 6–12)
POTASSIUM BLD-SCNC: 3.6 MMOL/L (ref 3.5–5.2)
PROT SERPL-MCNC: 7.6 G/DL (ref 6–8.5)
RBC # BLD AUTO: 4.87 10*6/MM3 (ref 3.77–5.28)
SODIUM BLD-SCNC: 140 MMOL/L (ref 136–145)
TROPONIN T SERPL-MCNC: <0.01 NG/ML (ref 0–0.03)
WBC NRBC COR # BLD: 5.86 10*3/MM3 (ref 3.4–10.8)
WHOLE BLOOD HOLD SPECIMEN: NORMAL
WHOLE BLOOD HOLD SPECIMEN: NORMAL

## 2019-07-01 PROCEDURE — 71275 CT ANGIOGRAPHY CHEST: CPT | Performed by: RADIOLOGY

## 2019-07-01 PROCEDURE — 99284 EMERGENCY DEPT VISIT MOD MDM: CPT

## 2019-07-01 PROCEDURE — 83880 ASSAY OF NATRIURETIC PEPTIDE: CPT | Performed by: EMERGENCY MEDICINE

## 2019-07-01 PROCEDURE — 84484 ASSAY OF TROPONIN QUANT: CPT | Performed by: EMERGENCY MEDICINE

## 2019-07-01 PROCEDURE — 0 IOVERSOL 74 % SOLUTION: Performed by: EMERGENCY MEDICINE

## 2019-07-01 PROCEDURE — 71275 CT ANGIOGRAPHY CHEST: CPT

## 2019-07-01 PROCEDURE — 85025 COMPLETE CBC W/AUTO DIFF WBC: CPT | Performed by: EMERGENCY MEDICINE

## 2019-07-01 PROCEDURE — 25010000002 DEXAMETHASONE PER 1 MG: Performed by: EMERGENCY MEDICINE

## 2019-07-01 PROCEDURE — 93005 ELECTROCARDIOGRAM TRACING: CPT | Performed by: EMERGENCY MEDICINE

## 2019-07-01 PROCEDURE — 71045 X-RAY EXAM CHEST 1 VIEW: CPT

## 2019-07-01 PROCEDURE — 25010000002 KETOROLAC TROMETHAMINE PER 15 MG: Performed by: EMERGENCY MEDICINE

## 2019-07-01 PROCEDURE — 85379 FIBRIN DEGRADATION QUANT: CPT | Performed by: EMERGENCY MEDICINE

## 2019-07-01 PROCEDURE — 71045 X-RAY EXAM CHEST 1 VIEW: CPT | Performed by: RADIOLOGY

## 2019-07-01 PROCEDURE — 80053 COMPREHEN METABOLIC PANEL: CPT | Performed by: EMERGENCY MEDICINE

## 2019-07-01 PROCEDURE — 96375 TX/PRO/DX INJ NEW DRUG ADDON: CPT

## 2019-07-01 PROCEDURE — 96374 THER/PROPH/DIAG INJ IV PUSH: CPT

## 2019-07-01 RX ORDER — DEXAMETHASONE SODIUM PHOSPHATE 4 MG/ML
4 INJECTION, SOLUTION INTRA-ARTICULAR; INTRALESIONAL; INTRAMUSCULAR; INTRAVENOUS; SOFT TISSUE ONCE
Status: COMPLETED | OUTPATIENT
Start: 2019-07-01 | End: 2019-07-01

## 2019-07-01 RX ORDER — KETOROLAC TROMETHAMINE 30 MG/ML
15 INJECTION, SOLUTION INTRAMUSCULAR; INTRAVENOUS ONCE
Status: COMPLETED | OUTPATIENT
Start: 2019-07-01 | End: 2019-07-01

## 2019-07-01 RX ORDER — KETOROLAC TROMETHAMINE 10 MG/1
10 TABLET, FILM COATED ORAL EVERY 6 HOURS PRN
Qty: 15 TABLET | Refills: 0 | Status: SHIPPED | OUTPATIENT
Start: 2019-07-01 | End: 2020-08-26 | Stop reason: ALTCHOICE

## 2019-07-01 RX ORDER — PREDNISONE 20 MG/1
20 TABLET ORAL 2 TIMES DAILY
Qty: 6 TABLET | Refills: 0 | Status: SHIPPED | OUTPATIENT
Start: 2019-07-01 | End: 2020-08-26 | Stop reason: ALTCHOICE

## 2019-07-01 RX ORDER — SODIUM CHLORIDE 0.9 % (FLUSH) 0.9 %
10 SYRINGE (ML) INJECTION AS NEEDED
Status: DISCONTINUED | OUTPATIENT
Start: 2019-07-01 | End: 2019-07-01 | Stop reason: HOSPADM

## 2019-07-01 RX ADMIN — DEXAMETHASONE SODIUM PHOSPHATE 4 MG: 4 INJECTION, SOLUTION INTRAMUSCULAR; INTRAVENOUS at 17:30

## 2019-07-01 RX ADMIN — KETOROLAC TROMETHAMINE 15 MG: 30 INJECTION, SOLUTION INTRAMUSCULAR at 16:52

## 2019-07-01 RX ADMIN — IOVERSOL 100 ML: 741 INJECTION INTRA-ARTERIAL; INTRAVENOUS at 15:40

## 2020-01-25 NOTE — ED PROVIDER NOTES
Subjective   Patient presents to ER with chest pain and left shoulder pain that began 8 days ago, and has persisted.         Chest Pain   Pain location:  Unable to specify  Pain quality: sharp and shooting    Pain severity:  Mild  Onset quality:  Gradual  Duration:  8 days  Chronicity:  New  Context: breathing    Associated symptoms: no shortness of breath        Review of Systems   Constitutional: Positive for activity change.   HENT: Negative.    Eyes: Negative.    Respiratory: Negative for chest tightness and shortness of breath.    Cardiovascular: Positive for chest pain.   Gastrointestinal: Negative.    Endocrine: Negative.    Genitourinary: Negative.    Musculoskeletal: Negative.    Skin: Negative.    Allergic/Immunologic: Negative.    Neurological: Negative.    Hematological: Negative.    Psychiatric/Behavioral: Negative.        Past Medical History:   Diagnosis Date   • Anxiety    • Arthritis    • Carpal tunnel syndrome    • Depression    • Hypertension    • Radius distal fracture    • Rectal bleeding    • Tendonitis        Allergies   Allergen Reactions   • Lodine [Etodolac]        Past Surgical History:   Procedure Laterality Date   • COLONOSCOPY  2011   • COLONOSCOPY N/A 9/6/2016    Procedure: COLONOSCOPY  CPTCODE: 77793;  Surgeon: Daniel Boone III, MD;  Location: [facility];  Service:    • FOOT SURGERY     • HYSTERECTOMY      43   • KNEE SURGERY     • ORIF ULNA/RADIUS FRACTURES Right 1/22/2019    Procedure: OPEN REDUCTION INTERNAL FIXATION DISTAL RADIUS;  Surgeon: Cristo Villeda MD;  Location: [facility];  Service: Orthopedics       Family History   Problem Relation Age of Onset   • Arthritis Mother    • Hypertension Mother    • Heart disease Father    • Diabetes Father    • Hypertension Father    • Hypertension Sister    • Heart disease Brother    • Hypertension Brother        Social History     Socioeconomic History   • Marital status: Legally      Spouse name: Not on file   •  Number of children: Not on file   • Years of education: Not on file   • Highest education level: Not on file   Tobacco Use   • Smoking status: Former Smoker     Packs/day: 0.25     Years: 10.00     Pack years: 2.50     Types: Cigarettes     Last attempt to quit: 2015     Years since quittin.5   • Smokeless tobacco: Never Used   Substance and Sexual Activity   • Alcohol use: Yes     Comment: socially   • Drug use: No   • Sexual activity: Defer           Objective   Physical Exam   Constitutional: She appears well-developed and well-nourished.   HENT:   Head: Normocephalic and atraumatic.   Eyes: EOM are normal. Pupils are equal, round, and reactive to light.   Neck: Normal range of motion.   Cardiovascular: Normal rate, regular rhythm and normal pulses.   Pulmonary/Chest: Effort normal.   Abdominal: Soft.   Musculoskeletal: Normal range of motion.        Right lower leg: Normal.        Left lower leg: Normal.   Neurological: She is alert.   Skin: Skin is warm.   Psychiatric: She has a normal mood and affect.   Nursing note and vitals reviewed.      Procedures           ED Course  ED Course as of 846   Mon 2019   1624 CT chest : no PE  [DANY]   1708 Troponin T: <0.010 [DANY]   1708 Troponin T: <0.010 [DANY]   1800 ECG from Dr Ashley office :8:48 NSR rate 64. Moderate high lateralrepolarizationdisturbance consistent with LVH.  [DANY]   1802 ECG 13:41NSR,rate 75. Possible left atrial enlargement. Non-specific T wave abnormalities. Some improvent over ECG done this AM in office.  [DANY]   1804 Dr Wolfe consulted times two  [DANY]      ED Course User Index  [DANY] Lenny Horn MD                  [facility]      Final diagnoses:   Atypical chest pain            Lenny Horn MD  [phone]     single lumen

## 2020-08-18 DIAGNOSIS — M25.531 RIGHT WRIST PAIN: Primary | ICD-10-CM

## 2020-08-26 ENCOUNTER — OFFICE VISIT (OUTPATIENT)
Dept: ORTHOPEDIC SURGERY | Facility: CLINIC | Age: 60
End: 2020-08-26

## 2020-08-26 ENCOUNTER — HOSPITAL ENCOUNTER (OUTPATIENT)
Dept: GENERAL RADIOLOGY | Facility: HOSPITAL | Age: 60
Discharge: HOME | End: 2020-08-26
Admitting: ORTHOPAEDIC SURGERY

## 2020-08-26 VITALS — WEIGHT: 188.4 LBS | HEIGHT: 60 IN | BODY MASS INDEX: 36.99 KG/M2 | TEMPERATURE: 97.8 F

## 2020-08-26 DIAGNOSIS — M25.531 RIGHT WRIST PAIN: Primary | ICD-10-CM

## 2020-08-26 DIAGNOSIS — M25.531 RIGHT WRIST PAIN: ICD-10-CM

## 2020-08-26 PROCEDURE — 99213 OFFICE O/P EST LOW 20 MIN: CPT | Performed by: ORTHOPAEDIC SURGERY

## 2020-08-26 PROCEDURE — 73110 X-RAY EXAM OF WRIST: CPT

## 2020-08-26 PROCEDURE — 73110 X-RAY EXAM OF WRIST: CPT | Performed by: RADIOLOGY

## 2020-08-26 RX ORDER — ASPIRIN 81 MG/1
81 TABLET ORAL DAILY
COMMUNITY
End: 2022-09-08 | Stop reason: HOSPADM

## 2020-08-26 RX ORDER — INDOMETHACIN 75 MG/1
75 CAPSULE, EXTENDED RELEASE ORAL DAILY
COMMUNITY
Start: 2020-06-17 | End: 2022-09-08 | Stop reason: HOSPADM

## 2020-08-26 NOTE — PROGRESS NOTES
Follow-up Visit         Patient: Bia Mott  YOB: 1960  Date of Encounter: 2020      Chief  Complaint:   Chief Complaint   Patient presents with   • Right Wrist - Follow-up, Pain, Edema     19 (19m) Cristo Villeda MD    Open Reduction Internal Fixation Distal Radius - Right               HPI:  Bia Mott, 59 y.o. female presents valuation of right wrist pain with some swelling over the past 3 to 4 weeks which has recently subsided today she has minimal pain and no localized swelling.  Sustained right distal radius fracture and we treated with open reduction internal fixation .  She has done well until this past episode beginning 3 to 4 weeks ago.  She does not experience weakness or numbness to her right hand.        Medical History:  Patient Active Problem List   Diagnosis   • Closed displaced comminuted fracture of shaft of right radius   • Hypertension   • Right wrist pain     Past Medical History:   Diagnosis Date   • Anxiety    • Arthritis    • Carpal tunnel syndrome    • Depression    • Hypertension    • Radius distal fracture    • Rectal bleeding    • Tendonitis            Social History:  Social History     Socioeconomic History   • Marital status: Legally      Spouse name: Not on file   • Number of children: Not on file   • Years of education: Not on file   • Highest education level: Not on file   Tobacco Use   • Smoking status: Former Smoker     Packs/day: 0.25     Years: 10.00     Pack years: 2.50     Types: Cigarettes     Last attempt to quit: 2015     Years since quittin.6   • Smokeless tobacco: Never Used   Substance and Sexual Activity   • Alcohol use: Yes     Comment: socially   • Drug use: No   • Sexual activity: Defer           Surgical History:  Past Surgical History:   Procedure Laterality Date   • COLONOSCOPY     • COLONOSCOPY N/A 2016    Procedure: COLONOSCOPY  CPTCODE: 38961;  Surgeon: Daniel Stephens  Mely CARDENAS MD;  Location: [facility];  Service:    • FOOT SURGERY     • HYSTERECTOMY      43   • KNEE SURGERY     • ORIF ULNA/RADIUS FRACTURES Right 1/22/2019    Procedure: OPEN REDUCTION INTERNAL FIXATION DISTAL RADIUS;  Surgeon: Cristo Villeda MD;  Location: [facility];  Service: Orthopedics           Radiology:   Xr Wrist 3+ View Right    Result Date: 8/27/2020  The fracture through the distal radius is now radiographically well-healed.  This report was finalized on 8/27/2020 7:59 AM by Dr. Arnulfo Chirinos II, MD.        Radiographs reviewed of her right wrist AP lateral show distal radius fracture secured with volar plate and screws fracture is united and hardware is in acceptable position.    Examination:   Examination right upper extremity she has longitudinal scar volar radial side of her wrist with no localized swelling or tenderness.  She demonstrates full pronation supination full flexion extension of her wrist and has normal neurovascular status.        Assessment & Plan:   59 y.o. female presents with 3 to 4-week history of right wrist pain without identifiable source in her including fracture which is united and hardware which appears to be acceptably positioned.  We discussed her options in the future if she continues to have problems we may consider removal of her hardware but for now we will simply observe she will follow-up as needed.         Diagnosis Plan   1. Right wrist pain           Cc:  Rubia Brandon MD              This document has been electronically signed by Cristo Villeda MD   August 27, 2020 18:09

## 2020-08-27 PROBLEM — M25.531 RIGHT WRIST PAIN: Status: ACTIVE | Noted: 2020-08-27

## 2020-09-17 DIAGNOSIS — M25.562 PAIN IN BOTH KNEES, UNSPECIFIED CHRONICITY: Primary | ICD-10-CM

## 2020-09-17 DIAGNOSIS — M25.561 PAIN IN BOTH KNEES, UNSPECIFIED CHRONICITY: Primary | ICD-10-CM

## 2020-09-28 ENCOUNTER — OFFICE VISIT (OUTPATIENT)
Dept: ORTHOPEDIC SURGERY | Facility: CLINIC | Age: 60
End: 2020-09-28

## 2020-09-28 ENCOUNTER — HOSPITAL ENCOUNTER (OUTPATIENT)
Dept: GENERAL RADIOLOGY | Facility: HOSPITAL | Age: 60
Discharge: HOME | End: 2020-09-28
Admitting: ORTHOPAEDIC SURGERY

## 2020-09-28 VITALS — WEIGHT: 180 LBS | BODY MASS INDEX: 35.34 KG/M2 | TEMPERATURE: 98.2 F | HEIGHT: 60 IN

## 2020-09-28 DIAGNOSIS — M17.0 PRIMARY OSTEOARTHRITIS OF KNEES, BILATERAL: Primary | ICD-10-CM

## 2020-09-28 DIAGNOSIS — M25.562 PAIN IN BOTH KNEES, UNSPECIFIED CHRONICITY: ICD-10-CM

## 2020-09-28 DIAGNOSIS — M25.561 PAIN IN BOTH KNEES, UNSPECIFIED CHRONICITY: ICD-10-CM

## 2020-09-28 PROCEDURE — 99213 OFFICE O/P EST LOW 20 MIN: CPT | Performed by: ORTHOPAEDIC SURGERY

## 2020-09-28 PROCEDURE — 73562 X-RAY EXAM OF KNEE 3: CPT

## 2020-09-28 PROCEDURE — 73562 X-RAY EXAM OF KNEE 3: CPT | Performed by: RADIOLOGY

## 2021-05-18 ENCOUNTER — IMMUNIZATION (OUTPATIENT)
Dept: VACCINE CLINIC | Facility: HOSPITAL | Age: 61
End: 2021-05-18

## 2021-05-18 PROCEDURE — 0001A: CPT | Performed by: INTERNAL MEDICINE

## 2021-05-18 PROCEDURE — 91300 HC SARSCOV02 VAC 30MCG/0.3ML IM: CPT | Performed by: INTERNAL MEDICINE

## 2021-06-08 ENCOUNTER — IMMUNIZATION (OUTPATIENT)
Dept: VACCINE CLINIC | Facility: HOSPITAL | Age: 61
End: 2021-06-08

## 2021-06-08 PROCEDURE — 91300 HC SARSCOV02 VAC 30MCG/0.3ML IM: CPT | Performed by: INTERNAL MEDICINE

## 2021-06-08 PROCEDURE — 0002A: CPT | Performed by: INTERNAL MEDICINE

## 2022-04-18 ENCOUNTER — OFFICE VISIT (OUTPATIENT)
Dept: ORTHOPEDIC SURGERY | Facility: CLINIC | Age: 62
End: 2022-04-18

## 2022-04-18 ENCOUNTER — HOSPITAL ENCOUNTER (OUTPATIENT)
Dept: GENERAL RADIOLOGY | Facility: HOSPITAL | Age: 62
Discharge: HOME | End: 2022-04-18
Admitting: ORTHOPAEDIC SURGERY

## 2022-04-18 VITALS — BODY MASS INDEX: 35.34 KG/M2 | HEIGHT: 60 IN | WEIGHT: 180 LBS

## 2022-04-18 DIAGNOSIS — M25.562 LEFT KNEE PAIN, UNSPECIFIED CHRONICITY: ICD-10-CM

## 2022-04-18 DIAGNOSIS — M17.12 PRIMARY OSTEOARTHRITIS OF LEFT KNEE: Primary | ICD-10-CM

## 2022-04-18 DIAGNOSIS — M25.562 LEFT KNEE PAIN, UNSPECIFIED CHRONICITY: Primary | ICD-10-CM

## 2022-04-18 PROCEDURE — 99213 OFFICE O/P EST LOW 20 MIN: CPT | Performed by: ORTHOPAEDIC SURGERY

## 2022-04-18 PROCEDURE — 73562 X-RAY EXAM OF KNEE 3: CPT | Performed by: RADIOLOGY

## 2022-04-18 PROCEDURE — 73562 X-RAY EXAM OF KNEE 3: CPT

## 2022-04-18 PROCEDURE — 20610 DRAIN/INJ JOINT/BURSA W/O US: CPT | Performed by: ORTHOPAEDIC SURGERY

## 2022-04-18 RX ORDER — LIDOCAINE HYDROCHLORIDE 20 MG/ML
5 INJECTION, SOLUTION INFILTRATION; PERINEURAL
Status: COMPLETED | OUTPATIENT
Start: 2022-04-18 | End: 2022-04-18

## 2022-04-18 RX ORDER — METHYLPREDNISOLONE ACETATE 40 MG/ML
40 INJECTION, SUSPENSION INTRA-ARTICULAR; INTRALESIONAL; INTRAMUSCULAR; SOFT TISSUE
Status: COMPLETED | OUTPATIENT
Start: 2022-04-18 | End: 2022-04-18

## 2022-04-18 RX ADMIN — METHYLPREDNISOLONE ACETATE 40 MG: 40 INJECTION, SUSPENSION INTRA-ARTICULAR; INTRALESIONAL; INTRAMUSCULAR; SOFT TISSUE at 21:38

## 2022-04-18 RX ADMIN — LIDOCAINE HYDROCHLORIDE 5 ML: 20 INJECTION, SOLUTION INFILTRATION; PERINEURAL at 21:38

## 2022-04-18 NOTE — PROGRESS NOTES
Follow-up Visit         Patient: Bia Mott  YOB: 1960  Date of Encounter: 2022      Chief  Complaint:   Chief Complaint   Patient presents with   • Left Knee - Pain, Follow-up, Edema         HPI:  Bia Mott, 61 y.o. female presents in follow-up with plaints of left knee pain.  She has experienced symptoms for many years.  While residing in [place] she received numerous intra-articular steroid injections and also injections with viscosupplementation she was provided braces and underwent arthroscopy to her left knee with scraping.  She improved with surgical procedure temporarily.  She has had no recent trauma denies giving way or locking.  She also denies weakness or numbness left knee.  She describes allergy to Lodine.        Medical History:  Patient Active Problem List   Diagnosis   • Closed displaced comminuted fracture of shaft of right radius   • Hypertension   • Right wrist pain   • Primary osteoarthritis of knees, bilateral     Past Medical History:   Diagnosis Date   • Anxiety    • Arthritis    • Carpal tunnel syndrome    • Depression    • Hypertension    • Radius distal fracture    • Rectal bleeding    • Tendonitis          Social History:  Social History     Socioeconomic History   • Marital status: Legally    Tobacco Use   • Smoking status: Former Smoker     Packs/day: 0.25     Years: 10.00     Pack years: 2.50     Types: Cigarettes     Quit date:      Years since quittin.2   • Smokeless tobacco: Never Used   Vaping Use   • Vaping Use: Some days   Substance and Sexual Activity   • Alcohol use: Yes     Comment: socially   • Drug use: No   • Sexual activity: Defer         Current Medications:    Current Outpatient Medications:   •  aspirin 81 MG EC tablet, Take 81 mg by mouth Daily., Disp: , Rfl:   •  indomethacin SR (INDOCIN SR) 75 MG CR capsule, Take 75 mg by mouth Daily., Disp: , Rfl:   •  lisinopril-hydrochlorothiazide (PRINZIDE,ZESTORETIC)  20-12.5 MG per tablet, Take 1 tablet by mouth Daily. As needed, Disp: , Rfl:   •  montelukast (SINGULAIR) 10 MG tablet, daily., Disp: , Rfl:   •  Omeprazole Magnesium (PRILOSEC PO), Take 30 mg by mouth., Disp: , Rfl:   •  vitamin D (ERGOCALCIFEROL) 90760 UNITS capsule capsule, 1 (One) Time Per Week. As needed, Disp: , Rfl:   •  ALLER-EASE 180 MG tablet, daily., Disp: , Rfl:       Allergies:  Allergies   Allergen Reactions   • Lodine [Etodolac] Delirium         Family History:  Family History   Problem Relation Age of Onset   • Arthritis Mother    • Hypertension Mother    • Heart disease Father    • Diabetes Father    • Hypertension Father    • Hypertension Sister    • Heart disease Brother    • Hypertension Brother          Surgical History:  Past Surgical History:   Procedure Laterality Date   • COLONOSCOPY  2011   • COLONOSCOPY N/A 9/6/2016    Procedure: COLONOSCOPY  CPTCODE: 51971;  Surgeon: Daniel Boone III, MD;  Location: [facility] OR;  Service:    • FOOT SURGERY     • HYSTERECTOMY      43   • KNEE SURGERY     • ORIF ULNA/RADIUS FRACTURES Right 1/22/2019    Procedure: OPEN REDUCTION INTERNAL FIXATION DISTAL RADIUS;  Surgeon: Cristo Villeda MD;  Location: [facility] OR;  Service: Orthopedics         Radiology:   XR Knee 3 View Left    Result Date: 4/18/2022  Progression of the osteoarthritis in the left knee.  This report was finalized on 4/18/2022 1:34 PM by Dr. Arnulfo Chirinos II, MD.          Radiographs left knee obtained today show advanced tricompartmental osteoarthritis with greatest involvement medial compartment with near complete absence of medial joint space.  Paired to radiographs obtained in 2020 there is mild progression.      Examination:   Examination left knee demonstrates mild effusion marked medial joint line tenderness moderate lateral joint line tenderness full extension with flexion to 130 degrees no instability normal patellar tracking with mild crepitance neurovascular exam  grossly intact.        Assessment & Plan:   61 y.o. female presents with chronic left knee complaints she is requesting consideration of arthroscopy left knee with scraping.  It was explained that she would not likely benefit that her best option long-term is to consider total knee arthroplasty.  Before proceeding today she is provided intra-articular steroid injection Depo-Medrol 40 mg with lidocaine block.  She will return in the future as needed.         Diagnosis Plan   1. Primary osteoarthritis of left knee           Large Joint Arthrocentesis: L knee  Date/Time: 4/18/2022 9:38 PM  Consent given by: patient  Timeout: Immediately prior to procedure a time out was called to verify the correct patient, procedure, equipment, support staff and site/side marked as required   Supporting Documentation  Indications: pain   Procedure Details  Location: knee - L knee  Preparation: Patient was prepped and draped in the usual sterile fashion  Needle size: 18 G  Approach: anterolateral  Medications administered: 5 mL lidocaine 2%; 40 mg methylPREDNISolone acetate 40 MG/ML  Patient tolerance: patient tolerated the procedure well with no immediate complications              Cc:  Rubia Brandon MD              This document has been electronically signed by Cristo Villeda MD   April 18, 2022 21:36 EDT

## 2022-06-15 ENCOUNTER — OFFICE VISIT (OUTPATIENT)
Dept: ORTHOPEDIC SURGERY | Facility: CLINIC | Age: 62
End: 2022-06-15

## 2022-06-15 VITALS — HEIGHT: 60 IN | BODY MASS INDEX: 35.34 KG/M2 | WEIGHT: 180 LBS

## 2022-06-15 DIAGNOSIS — M17.0 PRIMARY OSTEOARTHRITIS OF KNEES, BILATERAL: Primary | ICD-10-CM

## 2022-06-15 PROBLEM — S52.351A CLOSED DISPLACED COMMINUTED FRACTURE OF SHAFT OF RIGHT RADIUS: Status: RESOLVED | Noted: 2019-01-21 | Resolved: 2022-06-15

## 2022-06-15 PROCEDURE — 99213 OFFICE O/P EST LOW 20 MIN: CPT | Performed by: ORTHOPAEDIC SURGERY

## 2022-06-15 RX ORDER — LIDOCAINE 50 MG/G
2 PATCH TOPICAL AS NEEDED
COMMUNITY
Start: 2022-06-02

## 2022-06-15 RX ORDER — LANSOPRAZOLE 30 MG/1
30 CAPSULE, DELAYED RELEASE ORAL DAILY
COMMUNITY
Start: 2022-04-30

## 2022-06-15 NOTE — PROGRESS NOTES
Follow-up Visit         Patient: Bia Mott  YOB: 1960  Date of Encounter: 06/15/2022      Chief  Complaint:   Chief Complaint   Patient presents with   • Right Knee - Follow-up, Pain   • Left Knee - Follow-up, Pain         HPI:  Bia Mott, 61 y.o. female presents in follow-up with bilateral knee complaints, her left knee is her most symptomatic.  She has been provided in the past while living in [place] with numerous intra-articular steroid injections and also with visco-supplementation. She has been provided braces and she has undergone arthroscopy to her left knee.  She improved after her left knee arthroscopy but symptoms have now returned.  She was last seen 2022 and was provided intra-articular steroid injection. She reports relief for about 6 weeks but also reports that her right knee is becoming more painful.        Medical History:  Patient Active Problem List   Diagnosis   • Hypertension   • Right wrist pain   • Primary osteoarthritis of knees, bilateral     Past Medical History:   Diagnosis Date   • Anxiety    • Arthritis    • Carpal tunnel syndrome    • Closed displaced comminuted fracture of shaft of right radius 2019    Added automatically from request for surgery    • Depression    • Hypertension    • Radius distal fracture    • Rectal bleeding    • Tendonitis          Social History:  Social History     Socioeconomic History   • Marital status: Legally    Tobacco Use   • Smoking status: Former Smoker     Packs/day: 0.25     Years: 10.00     Pack years: 2.50     Types: Cigarettes     Quit date:      Years since quittin.4   • Smokeless tobacco: Never Used   Vaping Use   • Vaping Use: Some days   Substance and Sexual Activity   • Alcohol use: Yes     Comment: socially   • Drug use: No   • Sexual activity: Defer         Current Medications:    Current Outpatient Medications:   •  ALLER-EASE 180 MG tablet, daily., Disp: , Rfl:   •   aspirin 81 MG EC tablet, Take 81 mg by mouth Daily., Disp: , Rfl:   •  indomethacin SR (INDOCIN SR) 75 MG CR capsule, Take 75 mg by mouth Daily., Disp: , Rfl:   •  lansoprazole (PREVACID) 30 MG capsule, , Disp: , Rfl:   •  lidocaine (LIDODERM) 5 %, APPLY 2 PATCH(ES) TO MOST PAINFUL AREA.MAY REMAIN IN PLACE FOR UP TO 12 HOURS. THEN REMOVE PATCH(ES). (12 HOUR PATCH FREE PERIOD)., Disp: , Rfl:   •  lisinopril-hydrochlorothiazide (PRINZIDE,ZESTORETIC) 20-12.5 MG per tablet, Take 1 tablet by mouth Daily. As needed, Disp: , Rfl:   •  montelukast (SINGULAIR) 10 MG tablet, daily., Disp: , Rfl:   •  vitamin D (ERGOCALCIFEROL) 69184 UNITS capsule capsule, 1 (One) Time Per Week. As needed, Disp: , Rfl:   •  Omeprazole Magnesium (PRILOSEC PO), Take 30 mg by mouth., Disp: , Rfl:       Allergies:  Allergies   Allergen Reactions   • Lodine [Etodolac] Delirium         Family History:  Family History   Problem Relation Age of Onset   • Arthritis Mother    • Hypertension Mother    • Heart disease Father    • Diabetes Father    • Hypertension Father    • Hypertension Sister    • Heart disease Brother    • Hypertension Brother          Surgical History:  Past Surgical History:   Procedure Laterality Date   • COLONOSCOPY  2011   • COLONOSCOPY N/A 9/6/2016    Procedure: COLONOSCOPY  CPTCODE: 52414;  Surgeon: Daniel Boone III, MD;  Location: [facility] OR;  Service:    • FOOT SURGERY     • HYSTERECTOMY      43   • KNEE SURGERY     • ORIF ULNA/RADIUS FRACTURES Right 1/22/2019    Procedure: OPEN REDUCTION INTERNAL FIXATION DISTAL RADIUS;  Surgeon: Cristo Villeda MD;  Location: [facility] OR;  Service: Orthopedics         Radiology:   Radiographs left knee from previous visit revealed moderate tricompartmental osteoarthritis.      Examination:   Examination left knee reveals mild effusion moderate medial joint line tenderness she has full extension with flexion to 130 degrees with no gross instability.        Assessment & Plan:   61  y.o. female presents with 6-week response to intra-articular steroid injection left knee with the radiographs identifying significant osteoarthritis.  We discussed her options she is more interested in proceeding with definitive procedure.  Given her limited response to intra-articular steroid injection I think it is reasonable to proceed with total knee arthroplasty. She is referred to Dr. Lal [facility].         Diagnosis Plan   1. Primary osteoarthritis of knees, bilateral  Ambulatory Referral to Orthopedic Surgery               Cc:  Rubia Brandon MD              This document has been electronically signed by Cristo Villeda MD   June 16, 2022 10:29 EDT

## 2022-07-21 ENCOUNTER — PREP FOR SURGERY (OUTPATIENT)
Dept: OTHER | Facility: HOSPITAL | Age: 62
End: 2022-07-21

## 2022-07-21 ENCOUNTER — OFFICE VISIT (OUTPATIENT)
Dept: ORTHOPEDIC SURGERY | Facility: CLINIC | Age: 62
End: 2022-07-21

## 2022-07-21 VITALS
DIASTOLIC BLOOD PRESSURE: 82 MMHG | SYSTOLIC BLOOD PRESSURE: 122 MMHG | HEIGHT: 60 IN | BODY MASS INDEX: 29.61 KG/M2 | WEIGHT: 150.8 LBS

## 2022-07-21 DIAGNOSIS — M17.0 PRIMARY OSTEOARTHRITIS OF KNEES, BILATERAL: Primary | ICD-10-CM

## 2022-07-21 DIAGNOSIS — M17.12 PRIMARY OSTEOARTHRITIS OF LEFT KNEE: ICD-10-CM

## 2022-07-21 DIAGNOSIS — M17.12 PRIMARY OSTEOARTHRITIS OF LEFT KNEE: Primary | ICD-10-CM

## 2022-07-21 PROBLEM — M17.9 OA (OSTEOARTHRITIS) OF KNEE: Status: ACTIVE | Noted: 2022-07-21

## 2022-07-21 PROCEDURE — 99214 OFFICE O/P EST MOD 30 MIN: CPT | Performed by: ORTHOPAEDIC SURGERY

## 2022-07-21 RX ORDER — TRANEXAMIC ACID 10 MG/ML
1000 INJECTION, SOLUTION INTRAVENOUS ONCE
Status: CANCELLED | OUTPATIENT
Start: 2022-07-21 | End: 2022-07-21

## 2022-07-21 RX ORDER — CHLORHEXIDINE GLUCONATE 4 G/100ML
SOLUTION TOPICAL DAILY
Qty: 236 ML | Refills: 0 | Status: ON HOLD | OUTPATIENT
Start: 2022-07-21 | End: 2022-09-07

## 2022-07-21 RX ORDER — ACETAMINOPHEN 500 MG
1000 TABLET ORAL ONCE
Status: CANCELLED | OUTPATIENT
Start: 2022-07-21 | End: 2022-07-21

## 2022-07-21 RX ORDER — OXYCODONE HCL 10 MG/1
10 TABLET, FILM COATED, EXTENDED RELEASE ORAL ONCE
Status: CANCELLED | OUTPATIENT
Start: 2022-07-21 | End: 2022-07-21

## 2022-07-21 RX ORDER — CEFAZOLIN SODIUM 2 G/100ML
2 INJECTION, SOLUTION INTRAVENOUS ONCE
Status: CANCELLED | OUTPATIENT
Start: 2022-07-21 | End: 2022-07-21

## 2022-08-25 ENCOUNTER — OFFICE VISIT (OUTPATIENT)
Dept: ORTHOPEDIC SURGERY | Facility: CLINIC | Age: 62
End: 2022-08-25

## 2022-08-25 ENCOUNTER — PRE-ADMISSION TESTING (OUTPATIENT)
Dept: PREADMISSION TESTING | Facility: HOSPITAL | Age: 62
End: 2022-08-25

## 2022-08-25 ENCOUNTER — TELEPHONE (OUTPATIENT)
Dept: ORTHOPEDIC SURGERY | Facility: CLINIC | Age: 62
End: 2022-08-25

## 2022-08-25 VITALS
WEIGHT: 147.71 LBS | SYSTOLIC BLOOD PRESSURE: 122 MMHG | BODY MASS INDEX: 29 KG/M2 | DIASTOLIC BLOOD PRESSURE: 82 MMHG | HEIGHT: 60 IN

## 2022-08-25 VITALS — BODY MASS INDEX: 28.98 KG/M2 | WEIGHT: 147.6 LBS | HEIGHT: 60 IN

## 2022-08-25 DIAGNOSIS — M17.12 PRIMARY OSTEOARTHRITIS OF LEFT KNEE: Primary | ICD-10-CM

## 2022-08-25 DIAGNOSIS — M17.12 PRIMARY OSTEOARTHRITIS OF LEFT KNEE: ICD-10-CM

## 2022-08-25 LAB
ANION GAP SERPL CALCULATED.3IONS-SCNC: 8 MMOL/L (ref 5–15)
BASOPHILS # BLD MANUAL: 0 10*3/MM3 (ref 0–0.2)
BASOPHILS NFR BLD MANUAL: 0 % (ref 0–1.5)
BUN SERPL-MCNC: 15 MG/DL (ref 8–23)
BUN/CREAT SERPL: 21.7 (ref 7–25)
CALCIUM SPEC-SCNC: 10 MG/DL (ref 8.6–10.5)
CHLORIDE SERPL-SCNC: 100 MMOL/L (ref 98–107)
CO2 SERPL-SCNC: 27 MMOL/L (ref 22–29)
CREAT SERPL-MCNC: 0.69 MG/DL (ref 0.57–1)
CRP SERPL-MCNC: <0.3 MG/DL (ref 0–0.5)
DEPRECATED RDW RBC AUTO: 45.6 FL (ref 37–54)
EGFRCR SERPLBLD CKD-EPI 2021: 99.5 ML/MIN/1.73
EOSINOPHIL # BLD MANUAL: 0.04 10*3/MM3 (ref 0–0.4)
EOSINOPHIL NFR BLD MANUAL: 1 % (ref 0.3–6.2)
ERYTHROCYTE [DISTWIDTH] IN BLOOD BY AUTOMATED COUNT: 14.3 % (ref 12.3–15.4)
ERYTHROCYTE [SEDIMENTATION RATE] IN BLOOD: 19 MM/HR (ref 0–30)
GLUCOSE SERPL-MCNC: 107 MG/DL (ref 65–99)
HBA1C MFR BLD: 5.8 % (ref 4.8–5.6)
HCT VFR BLD AUTO: 41 % (ref 34–46.6)
HGB BLD-MCNC: 13.9 G/DL (ref 12–15.9)
LYMPHOCYTES # BLD MANUAL: 1.47 10*3/MM3 (ref 0.7–3.1)
LYMPHOCYTES NFR BLD MANUAL: 8 % (ref 5–12)
MCH RBC QN AUTO: 29.5 PG (ref 26.6–33)
MCHC RBC AUTO-ENTMCNC: 33.9 G/DL (ref 31.5–35.7)
MCV RBC AUTO: 87 FL (ref 79–97)
MONOCYTES # BLD: 0.32 10*3/MM3 (ref 0.1–0.9)
NEUTROPHILS # BLD AUTO: 2.14 10*3/MM3 (ref 1.7–7)
NEUTROPHILS NFR BLD MANUAL: 54 % (ref 42.7–76)
PLAT MORPH BLD: NORMAL
PLATELET # BLD AUTO: 201 10*3/MM3 (ref 140–450)
PMV BLD AUTO: 11 FL (ref 6–12)
POTASSIUM SERPL-SCNC: 5.1 MMOL/L (ref 3.5–5.2)
QT INTERVAL: 412 MS
QTC INTERVAL: 421 MS
RBC # BLD AUTO: 4.71 10*6/MM3 (ref 3.77–5.28)
RBC MORPH BLD: NORMAL
SODIUM SERPL-SCNC: 135 MMOL/L (ref 136–145)
VARIANT LYMPHS NFR BLD MANUAL: 11 % (ref 0–5)
VARIANT LYMPHS NFR BLD MANUAL: 26 % (ref 19.6–45.3)
WBC MORPH BLD: NORMAL
WBC NRBC COR # BLD: 3.96 10*3/MM3 (ref 3.4–10.8)

## 2022-08-25 PROCEDURE — S0260 H&P FOR SURGERY: HCPCS | Performed by: ORTHOPAEDIC SURGERY

## 2022-08-25 PROCEDURE — 85007 BL SMEAR W/DIFF WBC COUNT: CPT

## 2022-08-25 PROCEDURE — 85025 COMPLETE CBC W/AUTO DIFF WBC: CPT

## 2022-08-25 PROCEDURE — 93010 ELECTROCARDIOGRAM REPORT: CPT | Performed by: INTERNAL MEDICINE

## 2022-08-25 PROCEDURE — 83036 HEMOGLOBIN GLYCOSYLATED A1C: CPT

## 2022-08-25 PROCEDURE — 85652 RBC SED RATE AUTOMATED: CPT

## 2022-08-25 PROCEDURE — 36415 COLL VENOUS BLD VENIPUNCTURE: CPT

## 2022-08-25 PROCEDURE — 80048 BASIC METABOLIC PNL TOTAL CA: CPT

## 2022-08-25 PROCEDURE — 93005 ELECTROCARDIOGRAM TRACING: CPT

## 2022-08-25 PROCEDURE — 86140 C-REACTIVE PROTEIN: CPT

## 2022-08-25 ASSESSMENT — KOOS JR
KOOS JR SCORE: 13
KOOS JR SCORE: 54.84

## 2022-08-25 NOTE — PROGRESS NOTES
"    [facility] [place]        Subjective     CC: Follow-up (Pre-Op TKA (L) Dylan'd 9/7/22)      HPI    Bia Mott is a 61 y.o. female.  Patient returns the office today for her preop appointment.  Left total knee scheduled for 9/7/2022.    Overall, patient's symptoms are as above.  She is here with her son Maurizio and sister today.  Continues to have episodes of locking in the left knee.    ROS:    Constiutional:Pt denies fever, chills, nausea, or vomiting.  MSK:as above        Objective      Past Medical History  Past Medical History:   Diagnosis Date   • Anxiety    • Arthritis    • Carpal tunnel syndrome    • Closed displaced comminuted fracture of shaft of right radius 01/21/2019    Added automatically from request for surgery [id number]   • Depression    • History of chest pain 2019    pt had shingles   • Hypertension    • Radius distal fracture    • Rectal bleeding    • Shingles 2019   • Tendonitis          Physical Exam  /82   Ht 152.4 cm (60\")   Wt 67 kg (147 lb 11.3 oz)   BMI 28.85 kg/m²     Body mass index is 28.85 kg/m².    Patient is well nourished and well developed.        Ortho Exam      Musculoskeletal:  Global Assessment:  Overall assessment of Lower Extremity Muscle Strength and Tone:  Left quadriceps--5/5   Left hamstrings--5/5       Left tibialis anterior--5/5  Left gastroc-soleus--5/5  Left EHL --5/5    Lower Extremity:    Inspection and Palpation:  Left knee:  Tenderness:  Over the medial joint line and moderate severity  Effusion:  1+  Crepitus:  Positive  Pulses:  2+  Ecchymosis:  None  Warmth:  None     ROM:  Left:  Extension: 5     Flexion:120    Instability:    Left:    Lachman Test:  Negative   Varus stress test negative  Valgus stress test negative    Deformities/Malalignments/Discrepancies:    Left:  Genu Varum     Functional Testing:  Maribell's test:  Negative  Patella grind test:  Positive  Q-angle:  normal      Imaging/Labs/EMG Reviewed:  Imaging Results " (Last 24 Hours)     ** No results found for the last 24 hours. **        We reviewed the patient's laboratory studies taken from earlier today.  Hematocrit, CRP, sed rate, platelet count and renal function are within normal limits    Assessment    Assessment:  1. Primary osteoarthritis of left knee        Plan:  Recommend over the counter anti-inflammatories for pain and/or swelling  Left knee arthritis--plan will continue to be to do the patient surgery as an outpatient.  We will use [facility] for home health PT for 3 weeks and then outpatient PT in the [facility] after 3 weeks provided she is doing well.  We will use a full dose aspirin daily for DVT prophylaxis for 6 weeks.  Anticipate a press-fit implant because of her youth but will have cement as a backup.  Again the risk, benefits, potential hazards, typical recovery and rehab as well as reasonable turns were discussed with her and her family.  She had the opportunity questions and is eager to proceed.  Referral for home health PT was placed today.      Sunny Lal MD  08/25/22  12:49 EDT      Dictated Utilizing Dragon Dictation.

## 2022-08-25 NOTE — TELEPHONE ENCOUNTER
Short Term Disability Form completed and faxed to [employer] [phone], mailed copy of form to home address, placed at  to scan

## 2022-08-25 NOTE — PAT
An arrival time for procedure was not given during PAT visit. If patient had any questions or concerns about their arrival time, they were instructed to contact their surgeon/physician.  Additionally, if the patient referred to an arrival time that was acquired from their my chart account, patient was encouraged to verify that time with their surgeon/physician.  NO arrival times given in Pre Admission Testing Department.    Patient instructed to drink 20 ounces (or until full) of Gatorade and it needs to be completed 1 hour (for Main OR patients) or 2 hours (scheduled  section patients) before given arrival time for procedure (NO RED Gatorade)    Patient verbalized understanding.    Prescription for Bactroban and Chlorhexidine shower called into patient's pharmacy or [facility] pharmacy by patient's surgeon.  Reinforced with patient to  the prescription from applicable pharmacy if they haven't already.  Verbal and written instructions given regarding proper use of the Bactroban and Chlorhexidine to patient and/or famlily during PAT visit. Patient/family also instructed to complete checklist and return it to Pre-op on the day of surgery.  Patient and/or family verbalized understanding.      Patient to apply Chlorhexadine wipes  to surgical area (as instructed) the night before procedure and the AM of procedure. Wipes provided.    Patient viewed general PAT education video as instructed in their preoperative information received from their surgeon.  Patient stated the general PAT education video was viewed in its entirety and survey completed.  Copies of PAT general education handouts (Incentive Spirometry, Meds to Beds Program, Patient Belongings, Pre-op skin preparation instructions, Blood Glucose testing, Visitor policy, Surgery FAQ, Code H) distributed to patient if not printed. Education related to the PAT pass and skin preparation for surgery (if applicable) completed in PAT as a reinforcement to PAT  education video. Patient instructed to return PAT pass provided today as well as completed skin preparation sheet (if applicable) on the day of procedure.     Additionally if patient had not viewed video yet but intended to view it at home or in our waiting area, then referred them to the handout with QR code/link provided during PAT visit.  Instructed patient to complete survey after viewing the video in its entirety.  Encouraged patient/family to read PAT general education handouts thoroughly and notify PAT staff with any questions or concerns. Patient verbalized understanding of all information and priority content.    Patient denies any current skin issues.     Discussed with patient options for receiving total joint replacement education and assessed patient's ability and preference. Joint Replacement Guide given to patient during PAT visit since not received a copy within the last year. Encouraged patient/family to read guide thoroughly and notify PAT staff with any questions or concerns. Handout provided directing patient to links to watch online videos related to joint replacement surgery on the [facility] website. The handout gives detailed instructions for joining an online joint replacement class through Zoom or phone conference offered on Thursdays. Patient agreed to participate by watching videos online. Patient verbalized understanding of instructions and to complete the online learning tool survey. Encouraged to share information with family and/or . An overview of the joint replacement education was provided during the visit including general perioperative instructions that are routine for all surgical patients (PAT PASS, wipes, directions to pre-op, etc.).    EKG CLEARED BY DR. MONTGOMERY

## 2022-09-06 ENCOUNTER — ANESTHESIA EVENT (OUTPATIENT)
Dept: PERIOP | Facility: HOSPITAL | Age: 62
End: 2022-09-06

## 2022-09-06 RX ORDER — FAMOTIDINE 10 MG/ML
20 INJECTION, SOLUTION INTRAVENOUS ONCE
Status: CANCELLED | OUTPATIENT
Start: 2022-09-06 | End: 2022-09-06

## 2022-09-07 ENCOUNTER — ANESTHESIA EVENT CONVERTED (OUTPATIENT)
Dept: ANESTHESIOLOGY | Facility: HOSPITAL | Age: 62
End: 2022-09-07

## 2022-09-07 ENCOUNTER — APPOINTMENT (OUTPATIENT)
Dept: GENERAL RADIOLOGY | Facility: HOSPITAL | Age: 62
End: 2022-09-07

## 2022-09-07 ENCOUNTER — ANESTHESIA (OUTPATIENT)
Dept: PERIOP | Facility: HOSPITAL | Age: 62
End: 2022-09-07

## 2022-09-07 ENCOUNTER — TELEPHONE (OUTPATIENT)
Dept: ORTHOPEDIC SURGERY | Facility: CLINIC | Age: 62
End: 2022-09-07

## 2022-09-07 ENCOUNTER — HOSPITAL ENCOUNTER (OUTPATIENT)
Facility: HOSPITAL | Age: 62
Discharge: HOME | End: 2022-09-08
Attending: ORTHOPAEDIC SURGERY | Admitting: ORTHOPAEDIC SURGERY

## 2022-09-07 DIAGNOSIS — M17.12 PRIMARY OSTEOARTHRITIS OF LEFT KNEE: ICD-10-CM

## 2022-09-07 PROBLEM — Z96.652 STATUS POST TOTAL LEFT KNEE REPLACEMENT: Status: ACTIVE | Noted: 2022-09-07

## 2022-09-07 PROBLEM — R73.09 ELEVATED HEMOGLOBIN A1C: Status: ACTIVE | Noted: 2022-09-07

## 2022-09-07 LAB
GLUCOSE BLDC GLUCOMTR-MCNC: 101 MG/DL (ref 70–130)
POTASSIUM SERPL-SCNC: 4 MMOL/L (ref 3.5–5.2)

## 2022-09-07 PROCEDURE — 97110 THERAPEUTIC EXERCISES: CPT

## 2022-09-07 PROCEDURE — 27447 TOTAL KNEE ARTHROPLASTY: CPT | Performed by: PHYSICIAN ASSISTANT

## 2022-09-07 PROCEDURE — 25010000002 MORPHINE PER 10 MG: Performed by: ORTHOPAEDIC SURGERY

## 2022-09-07 PROCEDURE — 27447 TOTAL KNEE ARTHROPLASTY: CPT | Performed by: ORTHOPAEDIC SURGERY

## 2022-09-07 PROCEDURE — 25010000002 CEFAZOLIN IN DEXTROSE 2-4 GM/100ML-% SOLUTION: Performed by: ORTHOPAEDIC SURGERY

## 2022-09-07 PROCEDURE — 73560 X-RAY EXAM OF KNEE 1 OR 2: CPT

## 2022-09-07 PROCEDURE — 25010000002 PHENYLEPHRINE 10 MG/ML SOLUTION 1 ML VIAL: Performed by: ANESTHESIOLOGY

## 2022-09-07 PROCEDURE — 25010000002 PROPOFOL 10 MG/ML EMULSION: Performed by: ANESTHESIOLOGY

## 2022-09-07 PROCEDURE — 82962 GLUCOSE BLOOD TEST: CPT

## 2022-09-07 PROCEDURE — 25010000002 ROPIVACAINE PER 1 MG: Performed by: NURSE ANESTHETIST, CERTIFIED REGISTERED

## 2022-09-07 PROCEDURE — 25010000002 ROPIVACAINE PER 1 MG: Performed by: ORTHOPAEDIC SURGERY

## 2022-09-07 PROCEDURE — 25010000002 ONDANSETRON PER 1 MG: Performed by: ANESTHESIOLOGY

## 2022-09-07 PROCEDURE — 25010000002 DEXAMETHASONE PER 1 MG: Performed by: ANESTHESIOLOGY

## 2022-09-07 PROCEDURE — C1755 CATHETER, INTRASPINAL: HCPCS | Performed by: ORTHOPAEDIC SURGERY

## 2022-09-07 PROCEDURE — 25010000002 KETOROLAC TROMETHAMINE PER 15 MG: Performed by: ORTHOPAEDIC SURGERY

## 2022-09-07 PROCEDURE — C1713 ANCHOR/SCREW BN/BN,TIS/BN: HCPCS | Performed by: ORTHOPAEDIC SURGERY

## 2022-09-07 PROCEDURE — 97161 PT EVAL LOW COMPLEX 20 MIN: CPT

## 2022-09-07 PROCEDURE — 84132 ASSAY OF SERUM POTASSIUM: CPT | Performed by: ANESTHESIOLOGY

## 2022-09-07 PROCEDURE — C1776 JOINT DEVICE (IMPLANTABLE): HCPCS | Performed by: ORTHOPAEDIC SURGERY

## 2022-09-07 DEVICE — DEV CONTRL TISS STRATAFIX SYMM PDS PLUS VIL CT-1 45CM: Type: IMPLANTABLE DEVICE | Site: KNEE | Status: FUNCTIONAL

## 2022-09-07 DEVICE — ART/SRF KN PERSONA/VE PS CD/CR 4TO5 11MM LT: Type: IMPLANTABLE DEVICE | Site: KNEE | Status: FUNCTIONAL

## 2022-09-07 DEVICE — IMPLANTABLE DEVICE: Type: IMPLANTABLE DEVICE | Site: KNEE | Status: FUNCTIONAL

## 2022-09-07 DEVICE — CAP TOTL KN POROUS/HYBRID PRIMARY: Type: IMPLANTABLE DEVICE | Site: KNEE | Status: FUNCTIONAL

## 2022-09-07 RX ORDER — ASPIRIN 325 MG
325 TABLET, DELAYED RELEASE (ENTERIC COATED) ORAL DAILY
Status: DISCONTINUED | OUTPATIENT
Start: 2022-09-08 | End: 2022-09-08 | Stop reason: HOSPADM

## 2022-09-07 RX ORDER — BUPIVACAINE HCL/0.9 % NACL/PF 0.125 %
PLASTIC BAG, INJECTION (ML) EPIDURAL AS NEEDED
Status: DISCONTINUED | OUTPATIENT
Start: 2022-09-07 | End: 2022-09-07 | Stop reason: SURG

## 2022-09-07 RX ORDER — FAMOTIDINE 20 MG/1
20 TABLET, FILM COATED ORAL ONCE
Status: COMPLETED | OUTPATIENT
Start: 2022-09-07 | End: 2022-09-07

## 2022-09-07 RX ORDER — CEFAZOLIN SODIUM 2 G/100ML
2 INJECTION, SOLUTION INTRAVENOUS EVERY 8 HOURS
Status: DISPENSED | OUTPATIENT
Start: 2022-09-07 | End: 2022-09-08

## 2022-09-07 RX ORDER — ONDANSETRON 4 MG/1
4 TABLET, FILM COATED ORAL EVERY 8 HOURS PRN
Qty: 20 TABLET | Refills: 1 | Status: SHIPPED | OUTPATIENT
Start: 2022-09-07 | End: 2022-11-08

## 2022-09-07 RX ORDER — DEXAMETHASONE SODIUM PHOSPHATE 4 MG/ML
INJECTION, SOLUTION INTRA-ARTICULAR; INTRALESIONAL; INTRAMUSCULAR; INTRAVENOUS; SOFT TISSUE AS NEEDED
Status: DISCONTINUED | OUTPATIENT
Start: 2022-09-07 | End: 2022-09-07 | Stop reason: SURG

## 2022-09-07 RX ORDER — CEFAZOLIN SODIUM 2 G/100ML
2 INJECTION, SOLUTION INTRAVENOUS ONCE
Status: COMPLETED | OUTPATIENT
Start: 2022-09-07 | End: 2022-09-07

## 2022-09-07 RX ORDER — SODIUM CHLORIDE 0.9 % (FLUSH) 0.9 %
10 SYRINGE (ML) INJECTION EVERY 12 HOURS SCHEDULED
Status: DISCONTINUED | OUTPATIENT
Start: 2022-09-07 | End: 2022-09-07 | Stop reason: HOSPADM

## 2022-09-07 RX ORDER — SODIUM CHLORIDE, SODIUM LACTATE, POTASSIUM CHLORIDE, CALCIUM CHLORIDE 600; 310; 30; 20 MG/100ML; MG/100ML; MG/100ML; MG/100ML
9 INJECTION, SOLUTION INTRAVENOUS CONTINUOUS
Status: DISCONTINUED | OUTPATIENT
Start: 2022-09-07 | End: 2022-09-08 | Stop reason: HOSPADM

## 2022-09-07 RX ORDER — LIDOCAINE HYDROCHLORIDE 10 MG/ML
INJECTION, SOLUTION EPIDURAL; INFILTRATION; INTRACAUDAL; PERINEURAL AS NEEDED
Status: DISCONTINUED | OUTPATIENT
Start: 2022-09-07 | End: 2022-09-07 | Stop reason: SURG

## 2022-09-07 RX ORDER — SODIUM CHLORIDE, SODIUM LACTATE, POTASSIUM CHLORIDE, CALCIUM CHLORIDE 600; 310; 30; 20 MG/100ML; MG/100ML; MG/100ML; MG/100ML
100 INJECTION, SOLUTION INTRAVENOUS CONTINUOUS
Status: DISCONTINUED | OUTPATIENT
Start: 2022-09-07 | End: 2022-09-08 | Stop reason: HOSPADM

## 2022-09-07 RX ORDER — PANTOPRAZOLE SODIUM 40 MG/1
40 TABLET, DELAYED RELEASE ORAL
Status: DISCONTINUED | OUTPATIENT
Start: 2022-09-07 | End: 2022-09-08 | Stop reason: HOSPADM

## 2022-09-07 RX ORDER — IBUPROFEN 600 MG/1
600 TABLET ORAL EVERY 6 HOURS PRN
Status: DISCONTINUED | OUTPATIENT
Start: 2022-09-07 | End: 2022-09-08 | Stop reason: HOSPADM

## 2022-09-07 RX ORDER — PROPOFOL 10 MG/ML
VIAL (ML) INTRAVENOUS AS NEEDED
Status: DISCONTINUED | OUTPATIENT
Start: 2022-09-07 | End: 2022-09-07 | Stop reason: SURG

## 2022-09-07 RX ORDER — MIDAZOLAM HYDROCHLORIDE 1 MG/ML
1 INJECTION INTRAMUSCULAR; INTRAVENOUS
Status: DISCONTINUED | OUTPATIENT
Start: 2022-09-07 | End: 2022-09-07 | Stop reason: HOSPADM

## 2022-09-07 RX ORDER — OXYCODONE HYDROCHLORIDE AND ACETAMINOPHEN 5; 325 MG/1; MG/1
1 TABLET ORAL ONCE AS NEEDED
Status: DISCONTINUED | OUTPATIENT
Start: 2022-09-07 | End: 2022-09-08 | Stop reason: HOSPADM

## 2022-09-07 RX ORDER — MONTELUKAST SODIUM 10 MG/1
10 TABLET ORAL NIGHTLY
COMMUNITY

## 2022-09-07 RX ORDER — TRANEXAMIC ACID 10 MG/ML
1000 INJECTION, SOLUTION INTRAVENOUS ONCE
Status: DISCONTINUED | OUTPATIENT
Start: 2022-09-07 | End: 2022-09-07 | Stop reason: HOSPADM

## 2022-09-07 RX ORDER — OXYCODONE HYDROCHLORIDE 5 MG/1
5 TABLET ORAL EVERY 4 HOURS PRN
Qty: 30 TABLET | Refills: 0 | Status: SHIPPED | OUTPATIENT
Start: 2022-09-07 | End: 2022-11-08

## 2022-09-07 RX ORDER — SODIUM CHLORIDE 0.9 % (FLUSH) 0.9 %
10 SYRINGE (ML) INJECTION AS NEEDED
Status: DISCONTINUED | OUTPATIENT
Start: 2022-09-07 | End: 2022-09-07 | Stop reason: HOSPADM

## 2022-09-07 RX ORDER — TRANEXAMIC ACID 10 MG/ML
1000 INJECTION, SOLUTION INTRAVENOUS ONCE
Status: COMPLETED | OUTPATIENT
Start: 2022-09-07 | End: 2022-09-07

## 2022-09-07 RX ORDER — HYDROMORPHONE HYDROCHLORIDE 1 MG/ML
0.5 INJECTION, SOLUTION INTRAMUSCULAR; INTRAVENOUS; SUBCUTANEOUS
Status: DISCONTINUED | OUTPATIENT
Start: 2022-09-07 | End: 2022-09-07 | Stop reason: HOSPADM

## 2022-09-07 RX ORDER — BUPIVACAINE HYDROCHLORIDE 2.5 MG/ML
INJECTION, SOLUTION EPIDURAL; INFILTRATION; INTRACAUDAL
Status: COMPLETED | OUTPATIENT
Start: 2022-09-07 | End: 2022-09-07

## 2022-09-07 RX ORDER — LIDOCAINE HYDROCHLORIDE 10 MG/ML
0.5 INJECTION, SOLUTION EPIDURAL; INFILTRATION; INTRACAUDAL; PERINEURAL ONCE AS NEEDED
Status: COMPLETED | OUTPATIENT
Start: 2022-09-07 | End: 2022-09-07

## 2022-09-07 RX ORDER — OXYCODONE HCL 10 MG/1
10 TABLET, FILM COATED, EXTENDED RELEASE ORAL ONCE
Status: COMPLETED | OUTPATIENT
Start: 2022-09-07 | End: 2022-09-07

## 2022-09-07 RX ORDER — ROPIVACAINE HYDROCHLORIDE 2 MG/ML
INJECTION, SOLUTION EPIDURAL; INFILTRATION; PERINEURAL CONTINUOUS
Status: DISCONTINUED | OUTPATIENT
Start: 2022-09-07 | End: 2022-09-08 | Stop reason: HOSPADM

## 2022-09-07 RX ORDER — ONDANSETRON 2 MG/ML
INJECTION INTRAMUSCULAR; INTRAVENOUS AS NEEDED
Status: DISCONTINUED | OUTPATIENT
Start: 2022-09-07 | End: 2022-09-07 | Stop reason: SURG

## 2022-09-07 RX ORDER — MEPERIDINE HYDROCHLORIDE 25 MG/ML
12.5 INJECTION INTRAMUSCULAR; INTRAVENOUS; SUBCUTANEOUS
Status: DISCONTINUED | OUTPATIENT
Start: 2022-09-07 | End: 2022-09-07 | Stop reason: HOSPADM

## 2022-09-07 RX ORDER — EPHEDRINE SULFATE 50 MG/ML
5 INJECTION, SOLUTION INTRAVENOUS ONCE AS NEEDED
Status: DISCONTINUED | OUTPATIENT
Start: 2022-09-07 | End: 2022-09-07 | Stop reason: HOSPADM

## 2022-09-07 RX ORDER — ACETAMINOPHEN 500 MG
1000 TABLET ORAL ONCE
Status: COMPLETED | OUTPATIENT
Start: 2022-09-07 | End: 2022-09-07

## 2022-09-07 RX ORDER — LISINOPRIL 20 MG/1
20 TABLET ORAL
Status: DISCONTINUED | OUTPATIENT
Start: 2022-09-07 | End: 2022-09-08 | Stop reason: HOSPADM

## 2022-09-07 RX ORDER — BUPIVACAINE HYDROCHLORIDE 5 MG/ML
INJECTION, SOLUTION PERINEURAL
Status: COMPLETED | OUTPATIENT
Start: 2022-09-07 | End: 2022-09-07

## 2022-09-07 RX ORDER — ASPIRIN 325 MG
325 TABLET ORAL DAILY
Qty: 42 TABLET | Refills: 0 | Status: SHIPPED | OUTPATIENT
Start: 2022-09-07 | End: 2022-11-08

## 2022-09-07 RX ORDER — DOCUSATE SODIUM 100 MG/1
100 CAPSULE, LIQUID FILLED ORAL 2 TIMES DAILY
Qty: 60 CAPSULE | Refills: 1 | Status: SHIPPED | OUTPATIENT
Start: 2022-09-07 | End: 2022-11-08

## 2022-09-07 RX ORDER — ONDANSETRON 4 MG/1
4 TABLET, FILM COATED ORAL ONCE AS NEEDED
Status: DISCONTINUED | OUTPATIENT
Start: 2022-09-07 | End: 2022-09-08 | Stop reason: HOSPADM

## 2022-09-07 RX ORDER — ONDANSETRON 2 MG/ML
4 INJECTION INTRAMUSCULAR; INTRAVENOUS ONCE AS NEEDED
Status: DISCONTINUED | OUTPATIENT
Start: 2022-09-07 | End: 2022-09-07 | Stop reason: HOSPADM

## 2022-09-07 RX ORDER — FENTANYL CITRATE 50 UG/ML
50 INJECTION, SOLUTION INTRAMUSCULAR; INTRAVENOUS
Status: DISCONTINUED | OUTPATIENT
Start: 2022-09-07 | End: 2022-09-07 | Stop reason: HOSPADM

## 2022-09-07 RX ORDER — NALOXONE HCL 0.4 MG/ML
0.4 VIAL (ML) INJECTION AS NEEDED
Status: DISCONTINUED | OUTPATIENT
Start: 2022-09-07 | End: 2022-09-07 | Stop reason: HOSPADM

## 2022-09-07 RX ORDER — MAGNESIUM HYDROXIDE 1200 MG/15ML
LIQUID ORAL AS NEEDED
Status: DISCONTINUED | OUTPATIENT
Start: 2022-09-07 | End: 2022-09-07 | Stop reason: HOSPADM

## 2022-09-07 RX ADMIN — Medication 1000 MG: at 14:05

## 2022-09-07 RX ADMIN — CEFAZOLIN SODIUM 2 G: 2 INJECTION, SOLUTION INTRAVENOUS at 11:00

## 2022-09-07 RX ADMIN — Medication 100 MCG: at 11:49

## 2022-09-07 RX ADMIN — Medication 100 MCG: at 11:18

## 2022-09-07 RX ADMIN — ONDANSETRON 4 MG: 2 INJECTION INTRAMUSCULAR; INTRAVENOUS at 12:39

## 2022-09-07 RX ADMIN — MUPIROCIN 1 APPLICATION: 20 OINTMENT TOPICAL at 09:13

## 2022-09-07 RX ADMIN — TRANEXAMIC ACID 1000 MG: 10 INJECTION, SOLUTION INTRAVENOUS at 12:38

## 2022-09-07 RX ADMIN — Medication 100 MCG: at 11:38

## 2022-09-07 RX ADMIN — TRANEXAMIC ACID 1000 MG: 10 INJECTION, SOLUTION INTRAVENOUS at 11:01

## 2022-09-07 RX ADMIN — DEXAMETHASONE SODIUM PHOSPHATE 4 MG: 4 INJECTION, SOLUTION INTRA-ARTICULAR; INTRALESIONAL; INTRAMUSCULAR; INTRAVENOUS; SOFT TISSUE at 11:05

## 2022-09-07 RX ADMIN — PROPOFOL 50 MG: 10 INJECTION, EMULSION INTRAVENOUS at 10:50

## 2022-09-07 RX ADMIN — PHENYLEPHRINE HYDROCHLORIDE 0.2 MCG/KG/MIN: 10 INJECTION INTRAVENOUS at 12:00

## 2022-09-07 RX ADMIN — PANTOPRAZOLE SODIUM 40 MG: 40 TABLET, DELAYED RELEASE ORAL at 19:07

## 2022-09-07 RX ADMIN — OXYCODONE HYDROCHLORIDE 10 MG: 10 TABLET, FILM COATED, EXTENDED RELEASE ORAL at 09:13

## 2022-09-07 RX ADMIN — CEFAZOLIN SODIUM 2 G: 2 INJECTION, SOLUTION INTRAVENOUS at 23:27

## 2022-09-07 RX ADMIN — BUPIVACAINE HYDROCHLORIDE 20 ML: 2.5 INJECTION, SOLUTION EPIDURAL; INFILTRATION; INTRACAUDAL at 13:30

## 2022-09-07 RX ADMIN — LIDOCAINE HYDROCHLORIDE 0.5 ML: 10 INJECTION, SOLUTION EPIDURAL; INFILTRATION; INTRACAUDAL; PERINEURAL at 09:13

## 2022-09-07 RX ADMIN — ACETAMINOPHEN 1000 MG: 500 TABLET ORAL at 09:12

## 2022-09-07 RX ADMIN — LIDOCAINE HYDROCHLORIDE 50 MG: 10 INJECTION, SOLUTION EPIDURAL; INFILTRATION; INTRACAUDAL; PERINEURAL at 10:50

## 2022-09-07 RX ADMIN — BUPIVACAINE HYDROCHLORIDE 2 ML: 5 INJECTION, SOLUTION PERINEURAL at 10:56

## 2022-09-07 RX ADMIN — PROPOFOL 100 MCG/KG/MIN: 10 INJECTION, EMULSION INTRAVENOUS at 10:55

## 2022-09-07 RX ADMIN — SODIUM CHLORIDE, POTASSIUM CHLORIDE, SODIUM LACTATE AND CALCIUM CHLORIDE 9 ML/HR: 600; 310; 30; 20 INJECTION, SOLUTION INTRAVENOUS at 09:13

## 2022-09-07 RX ADMIN — SODIUM CHLORIDE, POTASSIUM CHLORIDE, SODIUM LACTATE AND CALCIUM CHLORIDE 100 ML/HR: 600; 310; 30; 20 INJECTION, SOLUTION INTRAVENOUS at 17:17

## 2022-09-07 RX ADMIN — FAMOTIDINE 20 MG: 20 TABLET ORAL at 09:13

## 2022-09-07 RX ADMIN — Medication 100 MCG: at 11:12

## 2022-09-07 NOTE — TELEPHONE ENCOUNTER
Caller: REEMA     Relationship to patient: [facility]     Best call back number: [phone]    Patient is needing: REEMA CALLED IN TO INFORM DR THAT THE ORDER THAT WAS SENT TO THEIR OFFICE IS OUT OF PT AREA AS SHE LIVES IN [place] , NEEDS TO GO TO [facility]. PLEASE SEE AND ADVISE

## 2022-09-07 NOTE — ANESTHESIA PROCEDURE NOTES
Spinal Block      Patient reassessed immediately prior to procedure    Patient location during procedure: OR  Indication:at surgeon's request  Performed ByVARGHESE: Prosper Daugherty SRNA  Preanesthetic Checklist  Completed: patient identified, IV checked, site marked, risks and benefits discussed, surgical consent, monitors and equipment checked, pre-op evaluation and timeout performed  Spinal Block Prep:  Patient Position:sitting  Sterile Tech:cap, gloves, sterile barriers and mask  Prep:Chloraprep  Patient Monitoring:blood pressure monitoring, continuous pulse oximetry and EKG  Spinal Block Procedure  Approach:midline  Guidance:landmark technique and palpation technique  Location:L4-L5  Needle Type:Sprotte  Needle Gauge:25 G  Placement of Spinal needle event:cerebrospinal fluid aspirated  Paresthesia: no  Fluid Appearance:clear  Medications: bupivacaine (MARCAINE) 0.5 % injection, 2 mL  Med Administered at 9/7/2022 10:56 AM   Post Assessment  Patient Tolerance:patient tolerated the procedure well with no apparent complications  Complications no  Additional Notes  Procedure:  Pt assisted to sitting position, with legs in position of comfort over side of bed.  Pt. instructed in optimal spine presentation, the spine was prepped/ Draped and the skin at insertion site was anesthetized with 1% Lidocaine 2 ml.  The spinal needle was then advanced until CSF flow was obtained and LA was injected:

## 2022-09-07 NOTE — ANESTHESIA PREPROCEDURE EVALUATION
Anesthesia Evaluation     Patient summary reviewed and Nursing notes reviewed   NPO Solid Status: > 8 hours  NPO Liquid Status: > 8 hours           Airway   Mallampati: II  TM distance: >3 FB  Neck ROM: full  No difficulty expected  Dental      Pulmonary    (+) a smoker,   (-) shortness of breath, recent URI  Cardiovascular     ECG reviewed    (+) hypertension,   (-) past MI, dysrhythmias, angina, cardiac stents    ROS comment: ECG NSR LAE LVHc  repolarization abnormality poss IMI       Neuro/Psych  (+) numbness, psychiatric history,    (-) seizures, CVA  GI/Hepatic/Renal/Endo    (-) no renal disease, diabetes, GI bleed (stable ), no thyroid disorder    Musculoskeletal     Abdominal    Substance History      OB/GYN          Other   arthritis,      ROS/Med Hx Other: Labs WNL                 Anesthesia Plan    ASA 2     spinal     (PFL   ACB/CAth)  intravenous induction     Anesthetic plan, risks, benefits, and alternatives have been provided, discussed and informed consent has been obtained with: patient.    Plan discussed with CRNA.        CODE STATUS:

## 2022-09-07 NOTE — H&P
Patient Name: Bia Mott  MRN: [id number]  : 1960  DOS: 2022    Attending: Sunny Lal,*    Primary Care Provider: Rubia Brandon MD      Chief complaint:  Left knee pain    Subjective   Patient is a pleasant 61 y.o. female presented for scheduled surgery by Dr. Lal. She underwent left total knee arthroplasty under spinal anesthesia.  She tolerated surgery well and was admitted for further medical management.  Her knee has been painful for over 20 years.  She has tried physical therapy, bracing, injections and has had no relief in her knee pain.    When seen in PACU she is doing well.  Her pain is well controlled.  She denies nausea, shortness of breath or chest pain.  No history of DVT or PE.    Allergies:  Allergies   Allergen Reactions   • Lodine [Etodolac] Delirium       Meds:  Medications Prior to Admission   Medication Sig Dispense Refill Last Dose   • lansoprazole (PREVACID) 30 MG capsule Take 30 mg by mouth Daily.   2022 at 0800   • lidocaine (LIDODERM) 5 % Place 2 patches on the skin as directed by provider As Needed for Moderate Pain .   Past Month at Unknown time   • lisinopril-hydrochlorothiazide (PRINZIDE,ZESTORETIC) 20-12.5 MG per tablet Take 1 tablet by mouth Daily.   2022 at 0800   • Omeprazole Magnesium (PRILOSEC PO) Take 20 mg by mouth Daily.   2022 at 0800   • aspirin 81 MG EC tablet Take 81 mg by mouth Daily.   9/3/2022   • indomethacin SR (INDOCIN SR) 75 MG CR capsule Take 75 mg by mouth Daily.   2022   • montelukast (SINGULAIR) 10 MG tablet Take 10 mg by mouth Every Night.   More than a month at Unknown time   • vitamin D (ERGOCALCIFEROL) 29651 UNITS capsule capsule Take 50,000 Units by mouth 1 (One) Time Per Week. As needed. Hasn't started yet.            History:   Past Medical History:   Diagnosis Date   • Anxiety    • Arthritis    • Carpal tunnel syndrome    • Closed displaced comminuted fracture of shaft of right radius 2019     "Added automatically from request for surgery    • Depression    • History of chest pain     pt had shingles   • Hypertension    • Radius distal fracture    • Rectal bleeding    • Shingles 2019   • Tendonitis      Past Surgical History:   Procedure Laterality Date   • COLONOSCOPY     • COLONOSCOPY N/A 2016    Procedure: COLONOSCOPY  CPTCODE: 67681;  Surgeon: Daniel Boone III, MD;  Location: [facility];  Service:    • FOOT SURGERY Right    • HYSTERECTOMY      43, complete   • KNEE SURGERY Left     arthroscopy   • ORIF ULNA/RADIUS FRACTURES Right 2019    Procedure: OPEN REDUCTION INTERNAL FIXATION DISTAL RADIUS;  Surgeon: Cristo Villeda MD;  Location: [facility];  Service: Orthopedics     Family History   Problem Relation Age of Onset   • Arthritis Mother    • Hypertension Mother    • Heart disease Father    • Diabetes Father    • Hypertension Father    • Hypertension Sister    • Heart disease Brother    • Hypertension Brother      Social History     Tobacco Use   • Smoking status: Former Smoker     Packs/day: 0.25     Years: 10.00     Pack years: 2.50     Types: Cigarettes     Quit date:      Years since quittin.6   • Smokeless tobacco: Never Used   Vaping Use   • Vaping Use: Some days   Substance Use Topics   • Alcohol use: Yes     Comment: socially   • Drug use: No   She lives with her son.  She has 4 children.  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    Review of Systems  Pertinent items are noted in HPI, all other systems reviewed and negative    Vital Signs  BP 95/59   Pulse 68   Temp 97.9 °F (36.6 °C) (Temporal)   Resp 16   Ht 152.4 cm (60\")   Wt 66.7 kg (147 lb)   SpO2 99%   BMI 28.71 kg/m²     Physical Exam:    General Appearance:    Alert, cooperative, in no acute distress   Head:    Normocephalic, without obvious abnormality, atraumatic   Eyes:            Lids and lashes normal, conjunctivae and sclerae normal, no   icterus, no pallor, corneas " clear,    Ears:    Ears appear intact with no abnormalities noted   Throat:   No oral lesions, no thrush, oral mucosa moist   Neck:   No adenopathy, supple, trachea midline, no thyromegaly    Lungs:     Clear to auscultation,respirations regular, even and unlabored    Heart:    Regular rhythm and normal rate, normal S1 and S2, no murmur, no gallop   Abdomen:     Normal bowel sounds, no masses, no organomegaly, soft non-tender, non-distended, no guarding, no rebound  tenderness   Genitalia:    Deferred   Extremities:  Left knee Ace wrap CDI.  Nerve block present.   Pulses:   Pulses palpable and equal bilaterally   Skin:   No bleeding, bruising or rash   Neurologic:   Cranial nerves 2 - 12 grossly intact. Flexion and dorsiflexion intact bilateral feet.        I reviewed the patient's new clinical results.       Results from last 7 days   Lab Units 09/07/22  0852   POTASSIUM mmol/L 4.0     Lab Results   Component Value Date    HGBA1C 5.80 (H) 08/25/2022      Latest Reference Range & Units 08/25/22 10:10   Glucose 65 - 99 mg/dL 107 (H)   Sodium 136 - 145 mmol/L 135 (L)   Potassium 3.5 - 5.2 mmol/L 5.1   CO2 22.0 - 29.0 mmol/L 27.0   Chloride 98 - 107 mmol/L 100   Anion Gap 5.0 - 15.0 mmol/L 8.0   Creatinine 0.57 - 1.00 mg/dL 0.69   BUN 8 - 23 mg/dL 15   BUN/Creatinine Ratio 7.0 - 25.0  21.7   Calcium 8.6 - 10.5 mg/dL 10.0   eGFR >60.0 mL/min/1.73 99.5   Hemoglobin A1C 4.80 - 5.60 % 5.80 (H)   C-Reactive Protein 0.00 - 0.50 mg/dL <0.30   WBC 3.40 - 10.80 10*3/mm3 3.96   RBC 3.77 - 5.28 10*6/mm3 4.71   Hemoglobin 12.0 - 15.9 g/dL 13.9   Hematocrit 34.0 - 46.6 % 41.0   RDW 12.3 - 15.4 % 14.3   MCV 79.0 - 97.0 fL 87.0   MCH 26.6 - 33.0 pg 29.5   MCHC 31.5 - 35.7 g/dL 33.9   MPV 6.0 - 12.0 fL 11.0   Platelets 140 - 450 10*3/mm3 201   (H): Data is abnormally high  (L): Data is abnormally low    Assessment and Plan:     Status post total left knee replacement    Hypertension    OA (osteoarthritis) of knee    Elevated  hemoglobin A1c      Plan  1. PT/OT- WBAT LLE  2. Pain control-prns, AC nerve block   3. IS-encourage  4. DVT proph- Mechs/ASA  5. Bowel regimen  6. Resume home medications as appropriate  7. Monitor post-op labs  8. DC planning for home, possibly this evening if clears PT    HTN  - Continue home lisinopril  -Hold HCTZ  - Monitor BP   - Holding parameters for BP meds  - Labetalol PRN for SBP>170    Elevated hemoglobin A1c: In prediabetic range  - Explained to patient implication of A1C elevation, need to modify diet and increase activity, and f/u with PCP.      Phyllis Pierson, IDANIA  09/07/22  16:04 EDT

## 2022-09-07 NOTE — PLAN OF CARE
Goal Outcome Evaluation:  Plan of Care Reviewed With: patient        Progress: no change  Outcome Evaluation: PT eval complete. Pt performed bed mobility with SBA. Pt performed STS with CGA and FWW. Pt required Mod A x2 to amb 30' with FWW secondary to L knee buckling, limiting functional mobility. L knee ROM 20-74. Pt educated on HEP and knee precautions. Recommend d/c home with assist and HHPT tomorrow following PT session.

## 2022-09-07 NOTE — OP NOTE
Orthopaedics Operative Report    PREOPERATIVE DIAGNOSIS: Primary osteoarthritis left knee    POSTOPERATIVE DIAGNOSIS: Same    PROCEDURE PERFORMED: Left total knee arthroplasty, CPT 02936    SURGEON: Sunny Lal MD    ANESTHESIA:  Spinal    STAFF:  Circulator: Syl Vogel RN; Randa Diallo RN  Scrub Person: Francisca Trimble; Ceasar Rodriguez  Vendor Representative: Gokul Luz  Nursing Assistant: Johan Day; Cameron Oconnor  Assistant: Funmilayo Arguello PA-C    TOURNIQUET TIME: 78 min    ESTIMATED BLOOD LOSS: 50mL    COMPLICATIONS: None apparent.    RELEASES:None required after bone cuts made and osteophytes removed    TXA:IV    IMPLANTS:     Implant Name Type Inv. Item Serial No.  Lot No. LRB No. Used Action   DEV CONTRL TISS STRATAFIX SYMM PDS PLUS ALFRED CT-1 45CM - [id number] Implant DEV CONTRL TISS STRATAFIX SYMM PDS PLUS ALFRED CT-1 45CM  [id number] Left 1 Implanted   COMP FEM PERSONA CR POR COCR NRW SZ4 LT - [id number] Implant COMP FEM PERSONA CR POR COCR NRW SZ4 LT  [employer] [id number] Left 1 Implanted   COMP PAT FLAKITO NEXGEN TRAB MTL STD [id number] - [id number] Implant COMP PAT FLAKITO NEXGEN TRAB MTL STD [id number]  [employer] [id number] Left 1 Implanted   STEM TIB PERSONA TRABECULAR 2PEG SZC LT - [id number] Implant STEM TIB PERSONA TRABECULAR 2PEG SZC LT  [employer] [id number] Left 1 Implanted   ART/SRF KN PERSONA/VE PS CD/CR 4TO5 11MM LT - [id number] Implant ART/SRF KN PERSONA/VE PS CD/CR 4TO5 11MM LT  [employer] [id number] Left 1 Implanted       Cyn Persona CR TM femur size 4 narrow   size C TM tibia  29 TM patella button   Size 11 Vitamin E Medial Congruent highly crosslinked polyethylene articular surface    PREOPERATIVE ANTIBIOTICS: Kefzol    REFERRING PHYSICIAN: Rubia Brandon MD    INDICATIONS: Failure of nonoperative treatment including injections, bracing, and activity modification.    DESCRIPTION OF PROCEDURE: After informed consent was obtained, the  patient was taken to the operating room. The patient was given a dose of IV antibiotics prior to incision.After the smooth induction of spinal anesthesia, the patient’s left lower extremity was prepped and draped in the usual fashion for this type of procedure. We performed a timeout to verify site and the procedure to be performed.  We began with exsanguination of the left lower extremity using an Esmarch bandage and inflation of tourniquet to 300 mmHg. We made our standard midline incision and medial parapatellar approach. We took 20% of the quadriceps tendon medially and a sleeve of tissue around the patella for repair as well a sliver of patellar tendon. Dissected extra-ostially but subperiosteally on the medial side taking off the superficial and deep MCL from the proximal tibia. These were protected throughout the procedure. Visible medial meniscus was excised. The retropatellar fat pad was excised. The ACL and visible lateral meniscus was excised as well as the synovium from the anterior surface of the distal femur.  Osteophytes were removed from the distal femur and proximal tibia at this point.        We then everted the patella and this was resurfaced, restoring patellar height. The patellar height was 23 mm preoperatively and we cut the patella to 13 mm and sized the patella to a 29.  The lugholes were drilled and the component slightly medialized.       We then turned our attention to preparation of the femur. We placed our intramedullary distal femoral guide into place set on 5 degrees of valgus and due to preoperative flexion contracture, we took an extra 2 mm of bone off of the distal femur. The distal femur was cut protecting medial and lateral structures.  We were vigilant about making sure the distal femoral cut was flat.  We then marked Maranda's line and sized our femur to be a size 4 narrow. We marked 3° of external rotation which correlated well with [place]'s line. We then secured this  block into place and made our anterior, posterior, and chamfer cuts.  Again, we were vigilant about the cut flatness to make sure that the bone was appropriate for press-fit.  It was noted that the bone was excellent quality overall.  The pins were removed and the bone cuts were completed and freshened up. We then excised our posterior cruciate ligament and exposed the proximal tibia. We took 10 mm of bone off the lateral side. We protected medial and lateral structures during our cut as well as the patellar tendon. We completed the cut. We sized the tibia to be a size C ensuring that we had cortical contact on the medial lateral aspects of the tibial component. We then removed meniscus from the back of the knee. We used our flexion extension blocks to make sure our flexion and extension gaps were acceptable.  We checked alignment at this point as well.  We checked the appropriateness of the tibial bone for press-fit and it was felt to be appropriate and excellent overall.  We then placed the cutting guide back on to the pins to ensure that the cut was flat.  Once this was ensured, we then completed the preparation of the tibia, aiming the center of the baseplate at the medial third of the tibial tubercle.      We then completed the preparation of our femoral component.  We then trialed and were stable on the medial side at 0°, 30°, 60°, 90° and 120°. The lug holes were then drilled.  We then implanted these components into place starting with the tibial component first.  We controlled the implantation to make sure that we were parallel with the tibial cut throughout implantation.  Once we ensured that the component was fully seated, we moved our attention to the femoral component.  We used our femoral component handle and impactor and  impacted the femoral component without difficulty into place.  Once we ensured that again the femoral component was fully seated, we then moved our attention to the patella component  and this was clamped into place without difficulty.  We then trialed and a size 11 polyethylene spacer had good stability and full range of motion.  We then thoroughly irrigated the knee at that point and injected our posterior capsule with our local anesthetic consisting of 20 mL of normal saline, 20 mL of half percent lidocaine with epi, 20 mL of half percent bupivacaine, 30 mg of Toradol, and 8 mg of morphine.  We then deflated the tourniquet prior to placement of our final polyethylene spacer. Any bleeding seen in the back of the knee was controlled. The final spacer was then secured into place. We then used a final irrigation consisting of Irricept, diluted betadine and normal saline throughout the knee.  We then closed our subcutaneous layer using a 0 strata fix, running 2-0 Vicryl and interrupted 2-0 Vicryl. We closed our skin using a running 3-0 Monocryl. We placed an Exofin Fusion dressing system over the incision and took down the drapes. The patient was transferred back to their hospital bed and then taken to the recovery room in stable condition. All counts were correct postoperatively. I performed the case.     First assistant: Funmilayo Arguello PA-C     The skilled assistance of the above noted first assistant was necessary during this complex surgical procedure.  The surgical assistant assisted with every aspect of the operation including, but not limited to, proper and safe positioning of the patient, obtaining adequate surgical exposure, manipulation of surgical instruments to make the proper bone cuts, cementing of the final implants, the continual process of hemostasis during the procedure itself in addition to surgical wound closure and removal of the patient from the operating table and returning the patient back to the [facility].  The assistance of the surgical assistant allowed me to perform the most sensitive and technical potions of this operation using 2 hands, thus enhancing  efficiency and patient safety.  This would not be possible without the help of a skilled assistant familiar with the procedure and capable of safely performing the aforementioned tasks.         POSTOPERATIVE PLAN:  1. Weight bearing as tolerated left lower extremity.  2. The patient will receive an indwelling low femoral nerve block in the recovery room.  3.  Patient will receive a dose of IV antibiotics prior to discharge home  4. Full dose ASA daily x 6 weeks beginning in the am for DVT prophylaxis.  5.  The patient will begin outpatient physical therapy in the next 24 to 48 hours  6.  Discharge home once the patient has met criteria  7.  Patient will be discharged home with a prescription for oxycodone, meloxicam, Tylenol, Colace, and Zofran in addition to their DVT prophylaxis    Sunny Lal M.D.*

## 2022-09-07 NOTE — CASE MANAGEMENT/SOCIAL WORK
Continued Stay Note   [place]     Patient Name: Bia Mott  MRN: [id number]  Today's Date: 9/7/2022    Admit Date: 9/7/2022     Discharge Plan     Row Name 09/07/22 1824       Plan    Plan SW follow up    Plan Comments MSW met with pt. at bedside. Pt. had a same day surgery. Pt. reports that she does not need any DME. Pt. reports that she wants her PT to be provided by [facility]. MSW faxed order to [facility]. No other needs or concerns at this time. MSW available.    Final Discharge Disposition Code 06 - home with home health care               Discharge Codes    No documentation.               Expected Discharge Date and Time     Expected Discharge Date Expected Discharge Time    Sep 7, 2022             JAYSON Sellers

## 2022-09-07 NOTE — INTERVAL H&P NOTE
Pre-Op H&P (See Recent Office Note Attached for Full H&P)    Patient Name: Bia Mott  : 1960  MRN: [id number]    Chief complaint: Left knee pain    HPI:      Patient is a 61 y.o. female who presents with left knee pain. Office note is up to date. No changes in symptoms or medical history. Patient anticipates a left total knee arthroplasty today with Dr. Lal.    Review of Systems:  General ROS:  no fever, chills, rashes.  No change since last office visit.  No recent sick exposure  Cardiovascular ROS: no chest pain or dyspnea on exertion  Respiratory ROS: no cough, shortness of breath, or wheezing    Immunization History:   Tetanus: Unknown  COVID:     Meds:    No current facility-administered medications on file prior to encounter.     Current Outpatient Medications on File Prior to Encounter   Medication Sig Dispense Refill    chlorhexidine (HIBICLENS) 4 % external liquid Apply  topically to the appropriate area as directed Daily. Shower w/ solution for 5 days before surgery. Bring to [facility] to be filled. 236 mL 0    lansoprazole (PREVACID) 30 MG capsule Take 30 mg by mouth Daily.      lidocaine (LIDODERM) 5 % Place 2 patches on the skin as directed by provider As Needed for Moderate Pain .      lisinopril-hydrochlorothiazide (PRINZIDE,ZESTORETIC) 20-12.5 MG per tablet Take 1 tablet by mouth Daily.      mupirocin (BACTROBAN) 2 % ointment 1 application into the nostril(s) as directed by provider 2 (Two) Times a Day. Apply to each nostril twice daily for 5 days before surgery. 22 g 0    Omeprazole Magnesium (PRILOSEC PO) Take 20 mg by mouth Daily.      aspirin 81 MG EC tablet Take 81 mg by mouth Daily.      indomethacin SR (INDOCIN SR) 75 MG CR capsule Take 75 mg by mouth Daily.      montelukast (SINGULAIR) 10 MG tablet Take 10 mg by mouth Every Night.      vitamin D (ERGOCALCIFEROL) 05504 UNITS capsule capsule Take 50,000 Units by mouth 1 (One) Time Per Week. As needed. Hasn't started yet.    "      Vital Signs:  /72 (BP Location: Right arm, Patient Position: Lying)   Pulse 89   Temp 97.2 °F (36.2 °C) (Temporal)   Resp 16   Ht 152.4 cm (60\")   Wt 66.7 kg (147 lb)   SpO2 100%   BMI 28.71 kg/m²     Physical Exam:    CV:  S1S2 regular rate and rhythm, no murmur               Resp:  Clear to auscultation; respirations regular, even and unlabored    Results Review:     Lab Results   Component Value Date    WBC 3.96 08/25/2022    HGB 13.9 08/25/2022    HCT 41.0 08/25/2022    MCV 87.0 08/25/2022     08/25/2022    NEUTROABS 2.14 08/25/2022    GLUCOSE 107 (H) 08/25/2022    BUN 15 08/25/2022    CREATININE 0.69 08/25/2022    EGFRIFNONA 94 07/01/2019    EGFRIFAFRI 114 07/01/2019     (L) 08/25/2022    K 4.0 09/07/2022     08/25/2022    CO2 27.0 08/25/2022    CALCIUM 10.0 08/25/2022    ALBUMIN 4.50 07/01/2019    AST 20 07/01/2019    ALT 16 07/01/2019    BILITOT 0.6 07/01/2019        I reviewed the patient's new clinical results.      Assessment/Plan:  Assessment:  Left knee pain  Primary osteoarthritis of left knee joint    Plan:  Total knee arthroplasty, left side  Electronically signed by Corina Lagunas PA-C    9/7/2022   10:28 EDT     H&P reviewed. The patient was examined and there are no changes to the H&P.        "

## 2022-09-07 NOTE — THERAPY EVALUATION
Patient Name: Bia Mott  : 1960    MRN: [id number]                              Today's Date: 2022       Admit Date: 2022    Visit Dx:     ICD-10-CM ICD-9-CM   1. Primary osteoarthritis of left knee  M17.12 715.16     Patient Active Problem List   Diagnosis   • Hypertension   • Right wrist pain   • Primary osteoarthritis of knees, bilateral   • OA (osteoarthritis) of knee   • Status post total left knee replacement   • Elevated hemoglobin A1c     Past Medical History:   Diagnosis Date   • Anxiety    • Arthritis    • Carpal tunnel syndrome    • Closed displaced comminuted fracture of shaft of right radius 2019    Added automatically from request for surgery    • Depression    • History of chest pain     pt had shingles   • Hypertension    • Radius distal fracture    • Rectal bleeding    • Shingles    • Tendonitis      Past Surgical History:   Procedure Laterality Date   • COLONOSCOPY     • COLONOSCOPY N/A 2016    Procedure: COLONOSCOPY  CPTCODE: 85552;  Surgeon: Daniel Boone III, MD;  Location: [facility];  Service:    • FOOT SURGERY Right    • HYSTERECTOMY      42, complete   • KNEE SURGERY Left     arthroscopy   • ORIF ULNA/RADIUS FRACTURES Right 2019    Procedure: OPEN REDUCTION INTERNAL FIXATION DISTAL RADIUS;  Surgeon: Cristo Villeda MD;  Location: [facility];  Service: Orthopedics      General Information     Row Name 22 1723          Physical Therapy Time and Intention    Document Type evaluation  -HP     Mode of Treatment physical therapy  -HP     Row Name 22 1723          General Information    Patient Profile Reviewed yes  -HP     Prior Level of Function min assist:;all household mobility;community mobility;gait;transfer;bed mobility;ADL's  -HP     Existing Precautions/Restrictions fall;other (see comments)  adductor canal nerve cath  -HP     Barriers to Rehab none identified  -HP     Row Name 22 1726          Living  Environment    People in Home child(andrea), adult  -     Row Name 09/07/22 1723          Home Main Entrance    Number of Stairs, Main Entrance none  -     Row Name 09/07/22 1723          Stairs Within Home, Primary    Stairs, Within Home, Primary BR upstairs  -     Number of Stairs, Within Home, Primary twelve  -     Row Name 09/07/22 1723          Cognition    Orientation Status (Cognition) oriented x 3  -     Row Name 09/07/22 1723          Safety Issues, Functional Mobility    Safety Issues Affecting Function (Mobility) insight into deficits/self-awareness;awareness of need for assistance;safety precaution awareness  -     Impairments Affecting Function (Mobility) balance;endurance/activity tolerance;pain;strength;sensation/sensory awareness;range of motion (ROM)  -           User Key  (r) = Recorded By, (t) = Taken By, (c) = Cosigned By    Initials Name Provider Type     Kathie Kessler, PT Physical Therapist               Mobility     Row Name 09/07/22 1725          Bed Mobility    Bed Mobility supine-sit;sit-supine  -     Supine-Sit [place] (Bed Mobility) standby assist  -     Sit-Supine [place] (Bed Mobility) standby assist  -     Comment, (Bed Mobility) VC for sequencing  -     Row Name 09/07/22 1725          Transfers    Comment, (Transfers) VC for hand placement. Pt hesitant to put weight through LLE. Needed encouragement for WB, denied pain.  -     Row Name 09/07/22 1725          Sit-Stand Transfer    Sit-Stand [place] (Transfers) contact guard;2 person assist  -     Assistive Device (Sit-Stand Transfers) walker, front-wheeled  -     Row Name 09/07/22 1725          Gait/Stairs (Locomotion)    [place] Level (Gait) moderate assist (50% patient effort);2 person assist  -     Assistive Device (Gait) walker, front-wheeled  -HP     Ambulated day of surgery or within 4 hours of PACU discharge yes  -HP     Distance in Feet (Gait) 30  -HP     Deviations/Abnormal  Patterns (Gait) bilateral deviations;base of support, narrow;stride length decreased;weight shifting decreased;gait speed decreased;vashti decreased  -     Bilateral Gait Deviations forward flexed posture  -     Left Sided Gait Deviations knee buckling, left side  -     Comment, (Gait/Stairs) Pt amb 30' with FWW and Mod A x2 for stability. Pt demonstrated step-through gait pattern with forward flexed posture and VC for increased knee extension on LLE during stance phase. Pt demonstrated two episodes of L knee buckling requiring Mod A x2 to recover. Activity limited due to safety concern.  -     Row Name 09/07/22 1725          Mobility    Extremity Weight-bearing Status left lower extremity  -     Left Lower Extremity (Weight-bearing Status) weight-bearing as tolerated (WBAT)  -           User Key  (r) = Recorded By, (t) = Taken By, (c) = Cosigned By    Initials Name Provider Type     Kathie Kessler, ZAYNAB Physical Therapist               Obj/Interventions     [facility] Name 09/07/22 1729          Range of Motion Comprehensive    General Range of Motion lower extremity range of motion deficits identified  -     Comment, General Range of Motion L knee ROM 20-74  -[facility] Name 09/07/22 1729          Strength Comprehensive (MMT)    General Manual Muscle Testing (MMT) Assessment lower extremity strength deficits identified  -     Comment, General Manual Muscle Testing (MMT) Assessment Pt able to perform B active ankle DF and multiple SLR without excessive extensor lag. L knee buckling with gait noted.  -     Row Name 09/07/22 1729          Motor Skills    Therapeutic Exercise knee;ankle  -[facility] Name 09/07/22 1729          Knee (Therapeutic Exercise)    Knee (Therapeutic Exercise) strengthening exercise;isometric exercises  -     Knee Isometrics (Therapeutic Exercise) left;quad sets;10 repetitions  -     Knee Strengthening (Therapeutic Exercise) left;SLR (straight leg raise);SAQ (short arc  quad);LAQ (long arc quad);heel slides;3 repetitions  -[facility] Name 09/07/22 1729          Ankle (Therapeutic Exercise)    Ankle (Therapeutic Exercise) AROM (active range of motion)  -     Ankle AROM (Therapeutic Exercise) bilateral;dorsiflexion;plantarflexion;10 repetitions  -[facility] Name 09/07/22 1729          Balance    Balance Assessment sitting static balance;sitting dynamic balance;sit to stand dynamic balance;standing static balance;standing dynamic balance  -     Static Sitting Balance standby assist  -     Dynamic Sitting Balance standby assist  -     Position, Sitting Balance sitting edge of bed  -     Static Standing Balance contact guard  -     Dynamic Standing Balance moderate assist  -     Position/Device Used, Standing Balance supported;walker, rolling  -     Balance Interventions sitting;standing;sit to stand;occupation based/functional task  -[facility] Name 09/07/22 1729          Sensory Assessment (Somatosensory)    Sensory Assessment (Somatosensory) LE sensation intact  -           User Key  (r) = Recorded By, (t) = Taken By, (c) = Cosigned By    Initials Name Provider Type     Kathie Kessler, PT Physical Therapist               Goals/Plan     [facility] Name 09/07/22 1734          Bed Mobility Goal 1 (PT)    Activity/Assistive Device (Bed Mobility Goal 1, PT) sit to supine/supine to sit  -     [place] Level/Cues Needed (Bed Mobility Goal 1, PT) modified independence  -HP     Time Frame (Bed Mobility Goal 1, PT) long term goal (LTG);3 days  -     Progress/Outcomes (Bed Mobility Goal 1, PT) goal ongoing  -[facility] Name 09/07/22 1734          Transfer Goal 1 (PT)    Activity/Assistive Device (Transfer Goal 1, PT) sit-to-stand/stand-to-sit  -     [place] Level/Cues Needed (Transfer Goal 1, PT) modified independence  -HP     Time Frame (Transfer Goal 1, PT) long term goal (LTG);3 days  -     Progress/Outcome (Transfer Goal 1, PT) goal ongoing  -[facility] Name  09/07/22 1734          Gait Training Goal 1 (PT)    Activity/Assistive Device (Gait Training Goal 1, PT) gait (walking locomotion)  -HP     [place] Level (Gait Training Goal 1, PT) modified independence  -HP     Distance (Gait Training Goal 1, PT) 150  -HP     Time Frame (Gait Training Goal 1, PT) long term goal (LTG);3 days  -HP     Progress/Outcome (Gait Training Goal 1, PT) goal ongoing  -     Row Name 09/07/22 1734          ROM Goal 1 (PT)    ROM Goal 1 (PT) L knee ROM 0-90  -HP     Time Frame (ROM Goal 1, PT) long-term goal (LTG);3 days  -HP     Progress/Outcome (ROM Goal 1, PT) goal ongoing  -     Row Name 09/07/22 1734          Stairs Goal 1 (PT)    Activity/Assistive Device (Stairs Goal 1, PT) ascending stairs;descending stairs  -HP     [place] Level/Cues Needed (Stairs Goal 1, PT) contact guard required  -HP     Number of Stairs (Stairs Goal 1, PT) 12  -HP     Time Frame (Stairs Goal 1, PT) long term goal (LTG);3 days  -HP     Progress/Outcome (Stairs Goal 1, PT) goal ongoing  -     Row Name 09/07/22 1734          Therapy Assessment/Plan (PT)    Planned Therapy Interventions (PT) balance training;bed mobility training;gait training;home exercise program;patient/family education;transfer training;stretching;strengthening;stair training;ROM (range of motion)  -           User Key  (r) = Recorded By, (t) = Taken By, (c) = Cosigned By    Initials Name Provider Type    HP Kathie Kessler, PT Physical Therapist               Clinical Impression     Row Name 09/07/22 1732          Pain    Pretreatment Pain Rating 0/10 - no pain  -HP     Posttreatment Pain Rating 0/10 - no pain  -HP     Pain Intervention(s) Cold applied;Repositioned;Ambulation/increased activity  -     Row Name 09/07/22 1732          Plan of Care Review    Plan of Care Reviewed With patient  -HP     Progress no change  -HP     Outcome Evaluation PT eval complete. Pt performed bed mobility with SBA. Pt performed STS with CGA and  FWW. Pt required Mod A x2 to amb 30' with FWW secondary to L knee buckling, limiting functional mobility. L knee ROM 20-74. Pt educated on HEP and knee precautions. Recommend d/c home with assist and HHPT tomorrow following PT session.  -     Row Name 09/07/22 1732          Therapy Assessment/Plan (PT)    Rehab Potential (PT) good, to achieve stated therapy goals  -HP     Criteria for Skilled Interventions Met (PT) yes;meets criteria;skilled treatment is necessary  -     Therapy Frequency (PT) 2 times/day  -     Row Name 09/07/22 1732          Vital Signs    Pre Systolic BP Rehab --  VSS  -HP     Pre Patient Position Supine  -HP     Intra Patient Position Standing  -HP     Post Patient Position Supine  -HP     Row Name 09/07/22 1732          Positioning and Restraints    Pre-Treatment Position in bed  -HP     Post Treatment Position bed  -HP     In Bed notified nsg;supine;fowlers;call light within reach;encouraged to call for assist;patient within staff view;side rails up x2  in PACU with nsg  -HP           User Key  (r) = Recorded By, (t) = Taken By, (c) = Cosigned By    Initials Name Provider Type    HP Kathie Kessler, PT Physical Therapist               Outcome Measures     Row Name 09/07/22 2658          How much help from another person do you currently need...    Turning from your back to your side while in flat bed without using bedrails? 4  -HP     Moving from lying on back to sitting on the side of a flat bed without bedrails? 4  -HP     Moving to and from a bed to a chair (including a wheelchair)? 3  -HP     Standing up from a chair using your arms (e.g., wheelchair, bedside chair)? 2  -HP     Climbing 3-5 steps with a railing? 2  -HP     To walk in hospital room? 2  -HP     AM-PAC 6 Clicks Score (PT) 17  -HP     Highest level of mobility 5 --> Static standing  -     Row Name 09/07/22 2667          PADD    Diagnosis 1  -HP     Gender 1  -HP     Age Group 2  -HP     Gait Distance 0  -HP     Assist  Level 0  -     Home Support 3  -     PADD Score 7  -     Patient Preference home with home health  -     Prediction by PADD Score extended rehabilitation  -     Row Name 09/07/22 1735          Functional Assessment    Outcome Measure Options AM-PAC 6 Clicks Basic Mobility (PT);PADD  -HP           User Key  (r) = Recorded By, (t) = Taken By, (c) = Cosigned By    Initials Name Provider Type     Kathie Kessler PT Physical Therapist                             Physical Therapy Education                 Title: PT OT SLP Therapies (Done)     Topic: Physical Therapy (Done)     Point: Mobility training (Done)     Learning Progress Summary           Patient Acceptance, E,D, VU,NR by  at 9/7/2022 1736                   Point: Home exercise program (Done)     Learning Progress Summary           Patient Acceptance, E,D, VU,NR by  at 9/7/2022 1736                   Point: Body mechanics (Done)     Learning Progress Summary           Patient Acceptance, E,D, VU,NR by  at 9/7/2022 1736                   Point: Precautions (Done)     Learning Progress Summary           Patient Acceptance, E,D, VU,NR by  at 9/7/2022 1736                               User Key     Initials Effective Dates Name Provider Type [facility] 06/01/21 -  Kathie Kessler, ZAYNAB Physical Therapist PT              PT Recommendation and Plan  Planned Therapy Interventions (PT): balance training, bed mobility training, gait training, home exercise program, patient/family education, transfer training, stretching, strengthening, stair training, ROM (range of motion)  Plan of Care Reviewed With: patient  Progress: no change  Outcome Evaluation: PT eval complete. Pt performed bed mobility with SBA. Pt performed STS with CGA and FWW. Pt required Mod A x2 to amb 30' with FWW secondary to L knee buckling, limiting functional mobility. L knee ROM 20-74. Pt educated on HEP and knee precautions. Recommend d/c home with assist and HHPT tomorrow  following PT session.     Time Calculation:    PT Charges     Row Name 09/07/22 1650             Time Calculation    Start Time 1650  -HP      PT Received On 09/07/22  -HP      PT Goal Re-Cert Due Date 09/17/22  -HP              Timed Charges    85546 - PT Therapeutic Exercise Minutes 10  -HP              Untimed Charges    PT Eval/Re-eval Minutes 47  -HP              Total Minutes    Timed Charges Total Minutes 10  -HP      Untimed Charges Total Minutes 47  -HP       Total Minutes 57  -HP            User Key  (r) = Recorded By, (t) = Taken By, (c) = Cosigned By    Initials Name Provider Type    HP Kathie Kessler, PT Physical Therapist              Therapy Charges for Today     Code Description Service Date Service Provider Modifiers Qty    44094325648 HC PT THER PROC EA 15 MIN 9/7/2022 Kathie Kessler, PT GP 1    79690448016 HC PT THER SUPP EA 15 MIN 9/7/2022 Kathie Kessler, PT GP 3    24792525133 HC PT EVAL LOW COMPLEXITY 4 9/7/2022 Kathie Kessler, PT GP 1          PT G-Codes  Outcome Measure Options: AM-PAC 6 Clicks Basic Mobility (PT), PADD  AM-PAC 6 Clicks Score (PT): 17    Kathie Kessler PT  9/7/2022

## 2022-09-07 NOTE — ANESTHESIA POSTPROCEDURE EVALUATION
Patient: Bia Mott    Procedure Summary     Date: 09/07/22 [facility] / [facility]  [facility]    Anesthesia Start: 1041 Anesthesia Stop: 1330    Procedure: TOTAL KNEE ARTHROPLASTY- LEFT (Left Knee) Diagnosis:       Primary osteoarthritis of left knee      (Primary osteoarthritis of left knee [M17.12])    Surgeons: Sunny Lal MD Provider: Paul Erwin MD    Anesthesia Type: spinal ASA Status: 2          Anesthesia Type: spinal    Vitals  Vitals Value Taken Time   /61 09/07/22 1329   Temp 97 °F (36.1 °C) 09/07/22 1329   Pulse 87 09/07/22 1329   Resp 16 09/07/22 1329   SpO2 99 % 09/07/22 1329           Post Anesthesia Care and Evaluation    Patient location during evaluation: PACU  Patient participation: complete - patient participated  Level of consciousness: awake and alert  Pain score: 0  Pain management: adequate    Airway patency: patent  Anesthetic complications: No anesthetic complications  PONV Status: none  Cardiovascular status: hemodynamically stable and acceptable  Respiratory status: nonlabored ventilation, acceptable and nasal cannula  Hydration status: acceptable  Post Neuraxial Block status: No signs or symptoms of PDPH

## 2022-09-07 NOTE — ANESTHESIA PROCEDURE NOTES
Peripheral Block      Patient reassessed immediately prior to procedure    Patient location during procedure: post-op  Reason for block: at surgeon's request and post-op pain management  Performed by  CRNA/CAA: Nataliia West CRNASRNA: Prosper Daugherty SRNA  Preanesthetic Checklist  Completed: patient identified, IV checked, site marked, risks and benefits discussed, surgical consent, monitors and equipment checked, pre-op evaluation and timeout performed  Prep:  Pt Position: supine  Sterile barriers:cap, gloves, mask and sterile barriers  Prep: ChloraPrep  Patient monitoring: blood pressure monitoring, continuous pulse oximetry and EKG  Procedure  Performed under: spinal  Guidance:ultrasound guided  Images:still images obtained, printed/placed on chart    Laterality:left  Block Type:adductor canal block  Injection Technique:catheter  Needle Type:Tuohy and echogenic  Needle Gauge:18 G  Resistance on Injection: none  Catheter Size:20 G (20g)  Cath Depth at skin: 10 cm    Medications Used: bupivacaine PF (MARCAINE) 0.25 % injection, 20 mL  Med administered at 9/7/2022 1:30 PM      Post Assessment  Injection Assessment: negative aspiration for heme, incremental injection and no paresthesia on injection  Patient Tolerance:comfortable throughout block  Complications:no  Additional Notes  Procedure:             The pt was placed in the Supine position.  The Insertion site was  prepped and Draped in sterile fashion.  The pt was anesthetized with  IV Sedation( see meds).  Skin and cutaneous tissue was infiltrated and anesthetized with 1% Lidocaine 3 mls via a 25g needle.  A BBraun 4 inch 18g echogenic needle was then  inserted approximately midline, mid-thigh and advanced In-plane with Ultrasound guidance.  Normal Saline PSF was utilized for hydrodissection of tissue.  The Vastus medialis and Sartorius muscle where visualized and the needle tip was placed in the adductor canal,  lateral to the femoral artery.  LA  injection spread was visualized, injection was incremental 1-5ml, injection pressure was normal or little, no intraneural injection, no vascular injection.  LA dose was injected thru the needle(see dose above).  A BBraun 20g wire stylet catheter was placed via the needle with ultrasound visualization and confirmation with NS fluid bolus. The catheter insertion site was sealed with exofin tissue adhesive. The labeled catheter was then coiled and secured to skin with benzoin,  steristrips and CHG transparent dressing.  Appropriate labels were applied.  Thank you.

## 2022-09-08 VITALS
TEMPERATURE: 97.9 F | DIASTOLIC BLOOD PRESSURE: 53 MMHG | RESPIRATION RATE: 18 BRPM | BODY MASS INDEX: 28.86 KG/M2 | HEART RATE: 73 BPM | WEIGHT: 147 LBS | SYSTOLIC BLOOD PRESSURE: 119 MMHG | OXYGEN SATURATION: 94 % | HEIGHT: 60 IN

## 2022-09-08 PROBLEM — G89.18 POSTOPERATIVE PAIN: Status: ACTIVE | Noted: 2022-09-08

## 2022-09-08 PROCEDURE — 97110 THERAPEUTIC EXERCISES: CPT

## 2022-09-08 PROCEDURE — 99024 POSTOP FOLLOW-UP VISIT: CPT | Performed by: ORTHOPAEDIC SURGERY

## 2022-09-08 PROCEDURE — 97116 GAIT TRAINING THERAPY: CPT

## 2022-09-08 RX ORDER — ROPIVACAINE HYDROCHLORIDE 2 MG/ML
1 INJECTION, SOLUTION EPIDURAL; INFILTRATION; PERINEURAL CONTINUOUS
Start: 2022-09-08

## 2022-09-08 RX ORDER — ACETAMINOPHEN 500 MG
1000 TABLET ORAL EVERY 8 HOURS
Refills: 0
Start: 2022-09-08 | End: 2022-09-15

## 2022-09-08 RX ADMIN — ASPIRIN 325 MG: 325 TABLET, COATED ORAL at 09:06

## 2022-09-08 RX ADMIN — PANTOPRAZOLE SODIUM 40 MG: 40 TABLET, DELAYED RELEASE ORAL at 05:06

## 2022-09-08 RX ADMIN — IBUPROFEN 600 MG: 600 TABLET ORAL at 10:22

## 2022-09-08 RX ADMIN — LISINOPRIL 20 MG: 20 TABLET ORAL at 09:06

## 2022-09-08 RX ADMIN — IBUPROFEN 600 MG: 600 TABLET ORAL at 05:11

## 2022-09-08 NOTE — DISCHARGE INSTRUCTIONS
InfuBLOCK - Patient Information    What is a pain pump?  InfuBLOCK is a postoperative, non-narcotic pain relief system that delivers local anesthetic to or near the surgical site. This is a pain minimizing therapy that delivers an anesthetic (numbing) medicine to the nerve.    The InfuBLOCK pain pump will continuously deliver a local anesthetic medication to block the pain in the area of your procedure.    Where can I find information about my pain pump?           For more information about your pain pump, scan the QR code.  For additional patient resources, visit [employer]/resources-pain-management.                                                                                             The [employer] Nursing Hotline is Here for You 24/7.     Call [phone] for Assistance.  While your physician is your primary source for information about your treatment., there may be times during your treatment that you need assistance with your infusion pump. Our team of compassionate and knowledgeable Registered Nursed (RN) is here to assist every step of the way.    Answers to questions about your infusion pump                 Tubing disconnect  Assistance with pump alarms                                                      Dislodged catheter  Excessive leakage noted from pump                                         Inadequate pain control   Nerve Catheter Removal Instructions  When your device is empty:    Remove your catheter by pulling the dressing off slowly (like you would remove a regular bandage). The catheter should pull right out of the skin.  Check that the BLUE tip is intact.                                                                                     If the catheter is stuck, reposition your   extremity and pull slowly until removed.  *If catheter is HURTING and WON'T come out, stop and call [phone] for further assistance.    Remove medication bag from the black carrying case.  Cut the  tubing on right and left side of pump, and discard the medication bag and tubing into garbage.  Place the pump and black carrying case into the plastic bag and then place this into the return box.  Seal box with blue stickers and return to [employer] postal service.    THIS IS PRE-PAID POSTAGE. SMI COLD THERAPY - PATIENT INSTRUCTION SHEET    Cold Compression Therapy for your comfort and rehabilitation  Your caregivers want you to be productive in your rehab and comfortable during your stay. In keeping with those goals, you will be receiving an SMI Cold Therapy Wrap to help ease post-operative pain and swelling that might keep you from getting back on track! Your SMI Cold Therapy Wrap is effective and simple-to-use, and you will be encouraged to apply it throughout your hospital stay and at home through the duration of your recovery.    When you are ready to go home  Be sure to take your SMI Cold Therapy Wrap and both sets of Gel Bags with you for continued comfort and use throughout your rehabilitation. If you don't already have them, ask your nurse or aide to retrieve your SMI Gel Bags from the patient freezer.    Home use precautions  Always follow your medical professional's application instructions upon discharge. Your SMI Cold Therapy Wrap and Gel Bags are designed to last for months following your surgery. Never heat the Gel Bags unless specified by your healthcare provider. Supervision is advised when using this product on children or geriatric patients. To avoid danger of suffocation, please keep the outer plastic packaging away from children & pets.    Cold Therapy Instructions  Place Gel Bags in a freezer set ¾ of the way to max temperature for at least (4) hours. For best results, lay the Gel Bags flat and [id number] in the freezer. Once frozen, slide Gel Bags into the gel pouch and secure your wrap to the affected area with the straps.  Gel wraps that have been stored in a freezer for an extended period of  time may require a (10) minute period of softening up in a room temperature environment before application.  The gel pouch acts as a protective barrier. NEVER place frozen bags directly onto skin, as this may cause frostbite injury.  The [facility] Cold Therapy Wrap is designed to be able to be worm while ambulating. The compression straps can be secured well enough so that the Wrap won't fall off while moving.  Wrap Application Videos can be viewed at [employer].  An additional protective barrier such as clothing, a washcloth, hand-towel or pillowcase may be used during prolonged treatment applications.  The Gel-Pouch and Wrap are both Latex-Free and the Gel Bag ingredients are non toxic.    SMI Wrap care instructions  The [facility] Cold Therapy Wrap may be hand washed and hung to dry when needed.    [facility] re-order information  Additional [facility] body specific wraps and/or Gel Bags can be re-ordered from [employer] or call [email or web address] ([phone])   EXOFIN CARING FOR YOUR WOUND    AFTER exofin Fusion SKIN CLOSURE SYSTEM HAS BEEN APPLIED    YOUR HEALTHCARE PROFESSIONAL HAS CHOSEN TO USE exofin Fusion SKIN CLOSURE SYSTEM TO CLOSE YOUR WOUND.    exocin Fusion is the combination of a mesh and liquid adhesive that allows the incision or wound to be held together during the healing process.    exocin Fusion should remain in place until your healthcare professional; has determined that adequate healing has occurred, which is usually anywhere between 7 to 14 days. In most cases, exofin Fusion is easily removed with little or no discomfort.    In the event that you notice that exofin Fusion is beginning to loosen or may be coming off, contact your healthcare professional.    The following information is provided to help you understand how to care for your incision and is based on the FDA-cleared product labeling.    You should always follow the instructions of your healthcare provider.    THINGS TO KNOW    BATHING  OR SHOWERING    If directed by your healthcare professional, you may occasionally and briefly wet your incision or wound that was treated with exofin Fusion in the shower or bath. Do not soak or scrub your incision or wound. Do not swim or soak your incision or wound in water. After showering or bathing, gently blot your incision or wound dry with a soft towel. If a dry protective dressing is being used over exofin Fusion, it should be replaced with a fresh, dry protective dressing after showering or bathing as directed by your healthcare practitioner. Care should also be taken so that any tape that may be part of the dry protective dressing does not come into contact with exofin Fusion because when the tape is removed, it may also remove exofin Fusion.    WOUND HEALING  If you experience any redness, swelling, discomfort, warmth or pus, contact your healthcare professional and he or she will determine how your incision or wound is healing and take the necessary steps to address any issues.    EXERCISE  Do not engage in strenuous exercise that may cause additional stress on your incision or wound. Follow your healthcare professional's guidance about when you can return to your normal activities.    OINTMENTS OR LIQUIDS  Topical ointments, liquids or any other products (other than dry bandages) should not be applied to the incision while exofin Fusion is in place. These may loosen exofin Fusion from the skin before it has completely healed.    REMOVING exofin Fusion  Your healthcare professional will determine when the healing process of your incision or wound has been completed and exofin Fusion is ready to be removed, which is usually between 7 to 14 days. When healing is complete, your healthcare professional will carefully peel off the exofin Fusion.    Prior to removal, do not scratch, rub or pick at the mesh. This may loosen the adhesive and mesh before the skin is healed.  In the event that you notice the  exofin Fusion is beginning to loosen and may be coming off or comes off with the skin/wound, contract your healthcare professional.    For complete directions on the use of exofin Fusion, please refer to instructions for Use (IFU) included in the product packaging.  IF YOU HAVE ANY QUESTIONS OR CONCERNS ABOUT exofin Fusion PLEASE CONTACT YOUR HEALTHCARE PROFESSIONAL.

## 2022-09-08 NOTE — THERAPY DISCHARGE NOTE
Patient Name: Bia Mott  : 1960    MRN: [id number]                              Today's Date: 2022       Admit Date: 2022    Visit Dx:     ICD-10-CM ICD-9-CM   1. Primary osteoarthritis of left knee  M17.12 715.16     Patient Active Problem List   Diagnosis   • Hypertension   • Right wrist pain   • Primary osteoarthritis of knees, bilateral   • OA (osteoarthritis) of knee   • Status post total left knee replacement   • Elevated hemoglobin A1c   • Postoperative pain     Past Medical History:   Diagnosis Date   • Anxiety    • Arthritis    • Carpal tunnel syndrome    • Closed displaced comminuted fracture of shaft of right radius 2019    Added automatically from request for surgery    • Depression    • History of chest pain     pt had shingles   • Hypertension    • Radius distal fracture    • Rectal bleeding    • Shingles 2019   • Tendonitis      Past Surgical History:   Procedure Laterality Date   • COLONOSCOPY     • COLONOSCOPY N/A 2016    Procedure: COLONOSCOPY  CPTCODE: 91483;  Surgeon: Daniel Boone III, MD;  Location: [facility] OR;  Service:    • FOOT SURGERY Right    • HYSTERECTOMY      42, complete   • KNEE SURGERY Left     arthroscopy   • ORIF ULNA/RADIUS FRACTURES Right 2019    Procedure: OPEN REDUCTION INTERNAL FIXATION DISTAL RADIUS;  Surgeon: Cristo Villeda MD;  Location: [facility] OR;  Service: Orthopedics   • TOTAL KNEE ARTHROPLASTY Left 2022    Procedure: TOTAL KNEE ARTHROPLASTY- LEFT;  Surgeon: Sunny Lal MD;  Location: [facility] OR;  Service: Orthopedics;  Laterality: Left;      General Information     Row Name 22 110          Physical Therapy Time and Intention    Document Type evaluation;discharge treatment  -SC     Mode of Treatment physical therapy  -SC     Row Name 22 110          General Information    Patient Profile Reviewed yes  -SC     Existing Precautions/Restrictions fall  nerve cath  -SC     Row  Name 09/08/22 1101          Cognition    Orientation Status (Cognition) oriented x 3  -SC     Row Name 09/08/22 1101          Safety Issues, Functional Mobility    Comment, Safety Issues/Impairments (Mobility) alert, following commands  -SC           User Key  (r) = Recorded By, (t) = Taken By, (c) = Cosigned By    Initials Name Provider Type    SC Richy Bergeron, PT Physical Therapist               Mobility     Row Name 09/08/22 1102          Bed Mobility    Bed Mobility supine-sit;scooting/bridging  -SC     Scooting/Bridging [place] (Bed Mobility) independent  -SC     Supine-Sit [place] (Bed Mobility) independent  -SC     Row Name 09/08/22 1102          Sit-Stand Transfer    Sit-Stand [place] (Transfers) modified independence  -SC     Assistive Device (Sit-Stand Transfers) walker, front-wheeled  -SC     Comment, (Sit-Stand Transfer) cues for hand placement. Demonstrated good technique  -SC     Row Name 09/08/22 1102          Gait/Stairs (Locomotion)    [place] Level (Gait) standby assist  -SC     Assistive Device (Gait) walker, front-wheeled  -SC     Distance in Feet (Gait) 100  -SC     Deviations/Abnormal Patterns (Gait) left sided deviations;ataxic;stride length decreased;weight shifting decreased  -SC     Bilateral Gait Deviations forward flexed posture  -SC     Left Sided Gait Deviations heel strike decreased  -SC     Assistive Device (Stairs) cane, straight  -SC     Handrail Location (Stairs) right side (ascending)  -SC     Number of Steps (Stairs) 12  -SC     Comment, (Gait/Stairs) gait training focused on achieving step through gait training . Encouraged L heel strike, Upright posture and to keep walker rolling. No buckling noted. Overall gait limited by increased pain. On stairs patient demonstrated good sequencing technique with cues for using cane  -SC     Row Name 09/08/22 1102          Mobility    Extremity Weight-bearing Status left lower extremity  -SC     Left Lower Extremity  (Weight-bearing Status) weight-bearing as tolerated (WBAT)  -SC           User Key  (r) = Recorded By, (t) = Taken By, (c) = Cosigned By    Initials Name Provider Type    SC Richy Bergeron PT Physical Therapist               Obj/Interventions     [facility] Name 09/08/22 1106          Range of Motion Comprehensive    Comment, General Range of Motion L [id number]  -SC     Row Name 09/08/22 1106          Motor Skills    Therapeutic Exercise knee;ankle  -SC     Row Name 09/08/22 1106          Knee (Therapeutic Exercise)    Knee (Therapeutic Exercise) strengthening exercise;isometric exercises  -SC     Knee Isometrics (Therapeutic Exercise) left;quad sets;gluteal sets;10 repetitions;3 second hold  -SC     Knee Strengthening (Therapeutic Exercise) left;heel slides;SLR (straight leg raise);SAQ (short arc quad);LAQ (long arc quad);sitting;supine;10 repetitions  -SC     Row Name 09/08/22 1106          Ankle (Therapeutic Exercise)    Ankle AROM (Therapeutic Exercise) bilateral;dorsiflexion;plantarflexion;10 repetitions  -SC     Row Name 09/08/22 1106          Balance    Balance Assessment standing dynamic balance  -SC     Dynamic Standing Balance 1-person assist;standby assist  -SC     Position/Device Used, Standing Balance supported;walker, rolling  -SC           User Key  (r) = Recorded By, (t) = Taken By, (c) = Cosigned By    Initials Name Provider Type    SC Richy Bergeron PT Physical Therapist               Goals/Plan     Row Name 09/08/22 1113          Bed Mobility Goal 1 (PT)    Activity/Assistive Device (Bed Mobility Goal 1, PT) sit to supine/supine to sit  -SC     [place] Level/Cues Needed (Bed Mobility Goal 1, PT) modified independence  -SC     Time Frame (Bed Mobility Goal 1, PT) long term goal (LTG);3 days  -SC     Progress/Outcomes (Bed Mobility Goal 1, PT) goal met  -SC     Row Name 09/08/22 1113          Transfer Goal 1 (PT)    Activity/Assistive Device (Transfer Goal 1, PT) sit-to-stand/stand-to-sit  -SC      [place] Level/Cues Needed (Transfer Goal 1, PT) modified independence  -SC     Time Frame (Transfer Goal 1, PT) long term goal (LTG);3 days  -SC     Progress/Outcome (Transfer Goal 1, PT) goal met  -[facility] Name 09/08/22 1113          Gait Training Goal 1 (PT)    Activity/Assistive Device (Gait Training Goal 1, PT) gait (walking locomotion)  -SC     [place] Level (Gait Training Goal 1, PT) modified independence  -SC     Distance (Gait Training Goal 1, PT) 150  -SC     Time Frame (Gait Training Goal 1, PT) long term goal (LTG);3 days  -SC     Progress/Outcome (Gait Training Goal 1, PT) goal partially met  -[facility] Name 09/08/22 1113          ROM Goal 1 (PT)    ROM Goal 1 (PT) L knee ROM 0-90  -SC     Time Frame (ROM Goal 1, PT) long-term goal (LTG);3 days  -SC     Progress/Outcome (ROM Goal 1, PT) goal ongoing;goal partially met  -[facility] Name 09/08/22 1113          Stairs Goal 1 (PT)    [place] Level/Cues Needed (Stairs Goal 1, PT) contact guard required  -SC     Number of Stairs (Stairs Goal 1, PT) 12  -SC     Time Frame (Stairs Goal 1, PT) long term goal (LTG);3 days  -SC     Progress/Outcome (Stairs Goal 1, PT) goal met  -SC           User Key  (r) = Recorded By, (t) = Taken By, (c) = Cosigned By    Initials Name Provider Type    SC Richy Bergeron, PT Physical Therapist               Clinical Impression     Row Name 09/08/22 1108          Pain    Pretreatment Pain Rating 5/10  -SC     Posttreatment Pain Rating 6/10  -SC     Pain Location - Side/Orientation Left  -SC     Pain Location - knee  -SC     Pain Intervention(s) Cold applied;Repositioned  -[facility] Name 09/08/22 1108          Plan of Care Review    Plan of Care Reviewed With patient  -SC     Progress improving  -SC     Outcome Evaluation Patient overall mobililty much improved today. No buckling noted during ambulation. She was able to safely preform stairs with a cane. She is going home with assistance. Her knee ROM ins 10-90  deg  -SC     Row Name 09/08/22 1108          Therapy Assessment/Plan (PT)    Patient/Family Therapy Goals Statement (PT) go home  -SC     Rehab Potential (PT) good, to achieve stated therapy goals  -SC     Criteria for Skilled Interventions Met (PT) yes;meets criteria;skilled treatment is necessary  -SC     Therapy Frequency (PT) 2 times/day  -SC     Row Name 09/08/22 1108          Positioning and Restraints    Pre-Treatment Position in bed  -SC     Post Treatment Position chair  -SC     In Chair notified nsg;reclined;sitting;call light within reach;encouraged to call for assist;exit alarm on  -SC           User Key  (r) = Recorded By, (t) = Taken By, (c) = Cosigned By    Initials Name Provider Type    SC Richy Bergreon, PT Physical Therapist               Outcome Measures     Row Name 09/08/22 1115          How much help from another person do you currently need...    Turning from your back to your side while in flat bed without using bedrails? 4  -SC     Moving from lying on back to sitting on the side of a flat bed without bedrails? 4  -SC     Moving to and from a bed to a chair (including a wheelchair)? 4  -SC     Standing up from a chair using your arms (e.g., wheelchair, bedside chair)? 4  -SC     Climbing 3-5 steps with a railing? 3  -SC     To walk in hospital room? 3  -SC     AM-PAC 6 Clicks Score (PT) 22  -SC     Highest level of mobility 7 --> Walked 25 feet or more  -SC     Row Name 09/08/22 1115          Functional Assessment    Outcome Measure Options AM-PAC 6 Clicks Basic Mobility (PT)  -SC           User Key  (r) = Recorded By, (t) = Taken By, (c) = Cosigned By    Initials Name Provider Type    SC Richy Bergeron, PT Physical Therapist              Physical Therapy Education                 Title: PT OT SLP Therapies (Done)     Topic: Physical Therapy (Done)     Point: Mobility training (Done)     Learning Progress Summary           Patient Fabián, SABRINA,TB,D,H, DU,EMA by SC at 9/8/2022 1115    Comment:  reviewed HEP    Acceptance, E,D, VU,NR by  at 9/7/2022 1736                   Point: Home exercise program (Done)     Learning Progress Summary           Patient Eager, E,TB,D,H, DU,VU by SC at 9/8/2022 1115    Comment: reviewed HEP    Acceptance, E,D, VU,NR by  at 9/7/2022 1736                   Point: Body mechanics (Done)     Learning Progress Summary           Patient Eager, E,TB,D,H, DU,VU by SC at 9/8/2022 1115    Comment: reviewed HEP    Acceptance, E,D, VU,NR by  at 9/7/2022 1736                   Point: Precautions (Done)     Learning Progress Summary           Patient Eager, E,TB,D,H, DU,VU by SC at 9/8/2022 1115    Comment: reviewed HEP    Acceptance, E,D, VU,NR by  at 9/7/2022 1736                               User Key     Initials Effective Dates Name Provider Type Discipline    SC 06/16/21 -  Richy Bergeron, PT Physical Therapist PT     06/01/21 -  Kathie Kessler PT Physical Therapist PT              PT Recommendation and Plan     Plan of Care Reviewed With: patient  Progress: improving  Outcome Evaluation: Patient overall mobililty much improved today. No buckling noted during ambulation. She was able to safely preform stairs with a cane. She is going home with assistance. Her knee ROM ins 10-90 deg     Time Calculation:    PT Charges     Row Name 09/08/22 1030             Time Calculation    Start Time 1030  -SC      PT Received On 09/08/22  -SC      PT Goal Re-Cert Due Date 09/17/22  -SC              Time Calculation- PT    Total Timed Code Minutes- PT 26 minute(s)  -SC              Timed Charges    54282 - PT Therapeutic Exercise Minutes 15  -SC      71836 - Gait Training Minutes  11  -SC              Total Minutes    Timed Charges Total Minutes 26  -SC       Total Minutes 26  -SC            User Key  (r) = Recorded By, (t) = Taken By, (c) = Cosigned By    Initials Name Provider Type    SC Richy Bergeron, PT Physical Therapist              Therapy Charges for Today     Code  Description Service Date Service Provider Modifiers Qty    [id number]  PT THER PROC EA 15 MIN 9/8/2022 Richy Bergeron, PT GP 1    [id number] HC GAIT TRAINING EA 15 MIN 9/8/2022 Richy Bergeron, PT GP 1    [id number]  PT THER SUPP EA 15 MIN 9/8/2022 Richy Bergeron, PT GP 2          PT G-Codes  Outcome Measure Options: AM-PAC 6 Clicks Basic Mobility (PT)  AM-PAC 6 Clicks Score (PT): 22    PT Discharge Summary  Anticipated Discharge Disposition (PT): home with assist, home with home health    Richy eBrgeron, PT  9/8/2022

## 2022-09-08 NOTE — PLAN OF CARE
Goal Outcome Evaluation:  Plan of Care Reviewed With: patient        Progress: improving  Outcome Evaluation: Patient overall mobililty much improved today. No buckling noted during ambulation. She was able to safely preform stairs with a cane. She is going home with assistance. Her knee ROM ins 10-90 deg

## 2022-09-08 NOTE — PLAN OF CARE
Patient ambulated 50ft assist x1 gait belt and walker, LLE numbness continues but patient reports it's improving, ropivacaine infusystem present, ice packs, and prn advil utilized for pain management. Dr. Dickson and Y sara. Plan to d/c today 9-8-22 with HHPT.      Goal Outcome Evaluation:

## 2022-09-08 NOTE — PROGRESS NOTES
"          Orthopaedic Surgery Progress Note      LOS: 0 days   Patient Care Team:  Rubia Brandon MD as PCP - General  Rubia Brandon MD as PCP - Family Medicine    POD 1    Subjective     Interval History:   Patient doing well this morning.  Denies chest pain or shortness of breath.  Spinal did not wear off until the evening.    Objective     Vital Signs:  Temp (24hrs), Av.4 °F (36.3 °C), Min:97 °F (36.1 °C), Max:97.9 °F (36.6 °C)    /70 (BP Location: Right arm, Patient Position: Lying)   Pulse 73   Temp 97.9 °F (36.6 °C) (Oral)   Resp 17   Ht 152.4 cm (60\")   Wt 66.7 kg (147 lb)   SpO2 94%   BMI 28.71 kg/m²     Labs:  Lab Results (last 24 hours)     Procedure Component Value Units Date/Time    POC Glucose Once [[id number]]  (Normal) Collected: 22 0903    Specimen: Blood Updated: 22 1305     Glucose 101 mg/dL      Comment: Meter: [id number] : [id number] Al Reagan       Potassium [[id number]]  (Normal) Collected: 22 0852    Specimen: Blood Updated: 22 0924     Potassium 4.0 mmol/L           Physical Exam:  EHL, FHL, gastroc soleus, and tibialis anterior are intact  Toes are pink and warm  Surgical dressing is in place  Palpable dorsalis pedis pulse  Calf is soft and nontender    Postoperative x-ray has been reviewed and looks acceptable    Assessment & Plan     Postoperative day number 1 status post left total knee arthroplasty.    Pain Control: Good overall    PT and OT     DVT prophylaxis: Full dose aspirin daily x6 weeks.  Begin this morning.    Discharge planning: Anticipate discharge home today around lunchtime    Upon discharge, patient will need follow-up with me in 3 weeks' time.  They will need [facility] for physical therapy.  Standard Exofin Fusion dressing care instructions.  Do not remove.  DME per case management.  Prescriptions have been written for [facility] retail pharmacy.    Admission Status:  I believe this patient meets " INPATIENT status due to the need for care which can only be reasonably provided in a hospital setting such as aggressive/expedited ancillary services and/or consultation services, the necessity for IV medications, close physician monitoring and/or the possible need for procedures.  In such, I feel patient’s risk for adverse outcomes and need for care warrant INPATIENT evaluation and predict the patient’s care encounter to likely last beyond 2 midnights.        Sunny Lal MD  09/08/22  08:26 EDT

## 2022-09-08 NOTE — DISCHARGE SUMMARY
Patient Name: Bia Mott  MRN: [id number]  : 1960  DOS: 2022    Attending: Sunny Lal,*    Primary Care Provider: Rubia Brandon MD    Date of Admission:.2022  7:45 AM    Date of Discharge:  2022    Discharge Diagnosis:   Status post total left knee replacement    Hypertension    OA (osteoarthritis) of knee    Elevated hemoglobin A1c      Hospital Course    At admit:  Patient is a pleasant 61 y.o. female presented for scheduled surgery by Dr. Lal. She underwent left total knee arthroplasty under spinal anesthesia.  She tolerated surgery well and was admitted for further medical management.  Her knee has been painful for over 20 years.  She has tried physical therapy, bracing, injections and has had no relief in her knee pain.     When seen in PACU she is doing well.  Her pain is well controlled.  She denies nausea, shortness of breath or chest pain.  No history of DVT or PE.     After admit:    Patient was provided pain medications as needed for pain control, along with adductor canal nerve block infusion of Ropivacaine.      Adjustments were made to pain medications to optimize postop pain management. Risks and benefits of opiate medications discussed with patient. MATHEW report was reviewed.    She was seen by PT and OT and has progressed well over her stay.    Patient used an IS for atelectasis prophylaxis and ASA along with mechanicals for DVT prophylaxis.    Home medications were resumed as appropriate.  With the progress she has made,  is ready for DC home today.      She will have an infuse pump ( instructed on it during this admit).    Discussed with patient regarding plan and she shows understanding and agreement.      Patient will have HHPT following discharge.        Procedures Performed  Procedure(s):  TOTAL KNEE ARTHROPLASTY- LEFT        Physical therapy  Goal Outcome Evaluation:  Plan of Care Reviewed With: patient  Progress:  "improving  Outcome Evaluation: Patient overall mobililty much improved today. No buckling noted during ambulation. She was able to safely preform stairs with a cane. She is going home with assistance. Her knee ROM ins 10-90 deg                  Discharge Assessment:       Visit Vitals  /70 (BP Location: Right arm, Patient Position: Lying)   Pulse 73   Temp 97.9 °F (36.6 °C) (Oral)   Resp 17   Ht 152.4 cm (60\")   Wt 66.7 kg (147 lb)   SpO2 94%   BMI 28.71 kg/m²     Temp (24hrs), Av.4 °F (36.3 °C), Min:97 °F (36.1 °C), Max:97.9 °F (36.6 °C)      General Appearance:    Alert, cooperative, in no acute distress   Lungs:     Clear to auscultation,respirations regular, even and                   unlabored    Heart:    Regular rhythm and normal rate, normal S1 and S2, 2/6 murmur   Abdomen:     Normal bowel sounds, no masses, no organomegaly, soft        non-tender, non-distended, no guarding, no rebound                 tenderness   Extremities:   CDI dressing surgical knee . ACB cath present, infuse pump.   Pulses:   Pulses palpable and equal bilaterally   Skin:   No bleeding, bruising or rash   Neurologic:   Cranial nerves 2 - 12 grossly intact, sensation intact, Flexion and dorsiflexion intact bilateral feet.         Discharge Disposition: Home.           Discharge Medications      New Medications      Instructions Start Date   acetaminophen 500 MG tablet  Commonly known as: TYLENOL   1,000 mg, Oral, Every 8 Hours, Take every 8 hours  as needed after 1 week      aspirin 325 MG tablet  Replaces: aspirin 81 MG EC tablet   325 mg, Oral, Daily      ondansetron 4 MG tablet  Commonly known as: Zofran   4 mg, Oral, Every 8 Hours PRN      oxyCODONE 5 MG immediate release tablet  Commonly known as: ROXICODONE   5 mg, Oral, Every 4 Hours PRN      ropivacaine 0.2 % infusion (INFUSYSTEM)  Commonly known as: NAROPIN   1 mL/hr (2 mg/hr), Peripheral Nerve, Continuous      Stool Softener 100 MG capsule  Generic drug: docusate " sodium   100 mg, Oral, 2 Times Daily         Continue These Medications      Instructions Start Date   lansoprazole 30 MG capsule  Commonly known as: PREVACID   30 mg, Oral, Daily      lidocaine 5 %  Commonly known as: LIDODERM   2 patches, Transdermal, As Needed      lisinopril-hydrochlorothiazide 20-12.5 MG per tablet  Commonly known as: PRINZIDE,ZESTORETIC   1 tablet, Oral, Daily      montelukast 10 MG tablet  Commonly known as: SINGULAIR   10 mg, Oral, Nightly      PRILOSEC PO   20 mg, Oral, Daily      vitamin D 1.25 MG (98053 UT) capsule capsule  Commonly known as: ERGOCALCIFEROL   50,000 Units, Oral, Weekly, As needed. Hasn't started yet.         Stop These Medications    aspirin 81 MG EC tablet  Replaced by: aspirin 325 MG tablet     indomethacin SR 75 MG CR capsule  Commonly known as: INDOCIN SR            Discharge Diet:   Diet Instructions     Advance Diet as Tolerated            Activity at Discharge:   Activity Instructions     Discharge Activity      Keep incision clean and dry  Cover with ACE wrap when wearing long pants  PT exercises BID when at home (set given by [facility] PT)  Use Walker or Crutches until cleared by outpatient therapist or HHPT    Range of Motion      0-90    Weight Bearing As Tolerated      Weight Bearing Status      Weight Bearing Status: As Tolerated          Follow-up Appointments   per his orders.        Dragon disclaimer:  Part of this encounter note is an electronic transcription/translation of spoken language to printed text. The electronic translation of spoken language may permit erroneous, or at times, nonsensical words or phrases to be inadvertently transcribed; Although I have reviewed the note for such errors, some may still exist.       Tammy Noel MD  09/08/22  10:53 EDT

## 2022-09-08 NOTE — CASE MANAGEMENT/SOCIAL WORK
Case Management Discharge Note      Final Note: Case mgt f/u. Ms Mott resides in [facility], therfore referred to [facility], I spoke with Maria Eugenia at [phone] who accepted referral for home PT, they plan to see her at home tommorrow. She will need a rolling walker,arrangementss made for one to be provided by [facility]. She plans to have her son stay with her. Noother d/c needs identified         Selected Continued Care - Admitted Since 9/7/2022     Destination    No services have been selected for the patient.              Durable Medical Equipment Coordination complete.    Service Provider Selected Services Address Phone Fax Patient Preferred    [facility]  Durable Medical Equipment [address] [place] [address] [phone] --          Dialysis/Infusion    No services have been selected for the patient.              Home Medical Care Coordination complete.    Service Provider Selected Services Address Phone Fax Patient Preferred    [facility]  Home Rehabilitation [address] [address] [phone] -- --          Therapy    No services have been selected for the patient.              Community Resources    No services have been selected for the patient.              Community & DME    No services have been selected for the patient.                       Final Discharge Disposition Code: 06 - home with home health care

## 2022-09-26 ENCOUNTER — APPOINTMENT (OUTPATIENT)
Dept: MAMMOGRAPHY | Facility: HOSPITAL | Age: 62
End: 2022-09-26

## 2022-09-29 ENCOUNTER — OFFICE VISIT (OUTPATIENT)
Dept: ORTHOPEDIC SURGERY | Facility: CLINIC | Age: 62
End: 2022-09-29

## 2022-09-29 ENCOUNTER — TELEPHONE (OUTPATIENT)
Dept: ORTHOPEDIC SURGERY | Facility: CLINIC | Age: 62
End: 2022-09-29

## 2022-09-29 VITALS — TEMPERATURE: 97.3 F

## 2022-09-29 DIAGNOSIS — Z96.652 S/P TOTAL KNEE ARTHROPLASTY, LEFT: Primary | ICD-10-CM

## 2022-09-29 PROCEDURE — 99024 POSTOP FOLLOW-UP VISIT: CPT | Performed by: ORTHOPAEDIC SURGERY

## 2022-09-29 RX ORDER — AZITHROMYCIN 250 MG/1
TABLET, FILM COATED ORAL
COMMUNITY
Start: 2022-09-19 | End: 2022-11-08

## 2022-09-29 NOTE — TELEPHONE ENCOUNTER
This patient is wanting to know when she is able to begin driving. She is 3 weeks post op left knee surgery.

## 2022-09-29 NOTE — PROGRESS NOTES
[facility] [place] [facility] Note        Subjective     Post-op (3 week post-op;S/P TOTAL KNEE ARTHROPLASTY- LEFT 09/7/2022)       IDANIA Mott is a 62 y.o. female.  Patient is here with her sister today for follow-up after left total knee arthroplasty on 8/7/2022.  She is doing home health 2-3 times a week.  Denies chest pain or shortness of breath.  Says the pain medication is not working.          Objective      Physical Exam  Temp 97.3 °F (36.3 °C)     There is no height or weight on file to calculate BMI.        Ortho Exam  Incision is healing and free of erythema or drainage.  Calf is soft and nontender.  Range of motion is 5-90.    Imaging Reviewed:  Imaging Results (Last 24 Hours)     Procedure Component Value Units Date/Time    XR Knee 3+ View With [place] Left [[id number]] Resulted: 09/29/22 1159     Updated: 09/29/22 1159    Narrative:      Knee X-ray    Indication: status-post TKA    Study:  AP, Lateral, and Sunrise views of Left knee    Comparison: Left knee 9/7/2022    Findings:  No signs of acute fracture are visualized  No signs of loosening are appreciated  Components are well aligned    Impression:  Status post Left total knee arthroplasty. No signs of   loosening or fracture.                Assessment    Assessment:  1. S/P total knee arthroplasty, left        Plan:  1. Status post left total knee arthroplasty--patient has been told to be as aggressive as she can with regards to range of motion restoration.  I will see her back in 3 weeks.  We offered to prescribe a different pain medication to her but she has declined the offer politely.  Discussed JOSHUA if she is not dramatically different with regards to motion but hopefully this will not be necessary.      Sunny Lal MD  09/29/22  12:55 EDT      Dictated Utilizing Dragon Dictation.  Answers for HPI/ROS submitted by the patient on 9/22/2022  Please describe your symptoms.: Follow up from knee replacement  Have you  had these symptoms before?: No  How long have you been having these symptoms?: 1-2 weeks  Please list any medications you are currently taking for this condition.: Just a follow up  Please describe any probable cause for these symptoms. : N/k  What is the primary reason for your visit?: Other

## 2022-09-29 NOTE — TELEPHONE ENCOUNTER
Called pt to let her know that we typically recommend being 4 weeks out from surgery and no longer taking any pain meds before resuming driving; She understood.    ALISA Enriquez

## 2022-10-27 ENCOUNTER — TELEPHONE (OUTPATIENT)
Dept: ORTHOPEDIC SURGERY | Facility: CLINIC | Age: 62
End: 2022-10-27

## 2022-10-27 NOTE — TELEPHONE ENCOUNTER
Spoke with patient, she will discuss therapy at follow up  Tuesday November 1 with Dr. Lal.    Suzie RAMIREZ(R)

## 2022-10-27 NOTE — TELEPHONE ENCOUNTER
Caller: PATIENT     Relationship: SELF     Best call back number: [phone]    What orders are you requesting (i.e. lab or imaging): PHYSICAL THERAPY ORDERS LEFT KNEE        Where will you receive your lab/imaging services: [facility]    Additional notes:   PT. NEEDS ANOTHER ORDER SENT FOR HOME PHYSICAL THERAPY.   [facility]-  FAX#- [phone]  PH#- [phone]   SHE STATES THAT THE PHYSICAL THERAPIST TOLD HER THAT THEY NEED A NEW ORDER.   PLEASE CALL PT. TO LET HER KNOW WHEN ORDER HAS BEEN SENT.

## 2022-11-01 ENCOUNTER — TELEPHONE (OUTPATIENT)
Dept: ORTHOPEDIC SURGERY | Facility: CLINIC | Age: 62
End: 2022-11-01

## 2022-11-01 NOTE — TELEPHONE ENCOUNTER
Caller: KATERIN MONTOYA    Relationship to patient: SELF    Best call back number: [phone]    Chief complaint: LEFT KNEE    Type of visit: FOLLOW UP    Requested date: NEXT WEEK    If rescheduling, when is the original appointment: 11/01/2022    Additional notes: PT HAD AN EMERGENCY COME UP AND HAD TO CANCEL HER APPT TODAY 11/01/2022 WITH DR FOSS. NEXT AVAIL ISNT UNTIL DEC. SHE SAID SHE CANT WAIT THAT LONG FOR AN APPT AS DR FOSS HAS HER OFF WORK AS SHE HAD A TOTAL KNEE REPLACEMENT 09/07/2022 AND SHE IS ALSO NEEDING HIM TO FILL OUT PAPERWORK    PLEASE CALL PATIENT. THANK YOU

## 2022-11-03 ENCOUNTER — TELEPHONE (OUTPATIENT)
Dept: ORTHOPEDIC SURGERY | Facility: CLINIC | Age: 62
End: 2022-11-03

## 2022-11-03 NOTE — TELEPHONE ENCOUNTER
Dariana Jones called from [facility] who stated they don't service the [facility], which is where the patient lives. Can anyone advise the patient in this situation.     Call back #: [phone]

## 2022-11-04 NOTE — TELEPHONE ENCOUNTER
Patient currently is seen by [facility].  The referral was errantly sent to [facility].      Suzie GROVESR)

## 2022-11-08 ENCOUNTER — OFFICE VISIT (OUTPATIENT)
Dept: ORTHOPEDIC SURGERY | Facility: CLINIC | Age: 62
End: 2022-11-08

## 2022-11-08 DIAGNOSIS — Z96.652 S/P TOTAL KNEE ARTHROPLASTY, LEFT: Primary | ICD-10-CM

## 2022-11-08 PROCEDURE — 99024 POSTOP FOLLOW-UP VISIT: CPT

## 2022-11-08 RX ORDER — MELOXICAM 15 MG/1
TABLET ORAL
Qty: 30 TABLET | Refills: 1 | Status: SHIPPED | OUTPATIENT
Start: 2022-11-08

## 2022-11-08 NOTE — PROGRESS NOTES
[facility] Orthopaedic Surgery Office Follow Up       Office Follow Up Visit       Patient Name: Bia Mott    Chief Complaint:   Chief Complaint   Patient presents with   • Post-op     6 week f/u--2 months S/P TOTAL KNEE ARTHROPLASTY- LEFT 2022       Referring Physician: No ref. provider found    History of Present Illness:   Bia Mott returns to clinic today for postop visit following total knee arthroplasty with Dr. Lal on 2022.  She was completing home physical therapy up until 2 weeks ago when her referral .  She says there was issues getting home physical therapy approved again.  She reports getting to 110 degrees of flexion with her therapist Italo.       Subjective     Review of Systems     I have reviewed and updated the following portions of the patient's history and review of systems: allergies, current medications, past family history, past medical history, past social history, past surgical history and problem list.    Medications:   Current Outpatient Medications:   •  lansoprazole (PREVACID) 30 MG capsule, Take 30 mg by mouth Daily., Disp: , Rfl:   •  lidocaine (LIDODERM) 5 %, Place 2 patches on the skin as directed by provider As Needed for Moderate Pain ., Disp: , Rfl:   •  lisinopril-hydrochlorothiazide (PRINZIDE,ZESTORETIC) 20-12.5 MG per tablet, Take 1 tablet by mouth Daily., Disp: , Rfl:   •  meloxicam (MOBIC) 15 MG tablet, 1 PO Daily with food.  Stop if you have upset stomach/stomach irritation., Disp: 30 tablet, Rfl: 1  •  montelukast (SINGULAIR) 10 MG tablet, Take 10 mg by mouth Every Night., Disp: , Rfl:   •  Omeprazole Magnesium (PRILOSEC PO), Take 20 mg by mouth Daily., Disp: , Rfl:   •  ropivacaine (NAROPIN) 0.2 % infusion (INFUSYSTEM), 2 mg/hr by Peripheral Nerve route Continuous., Disp: , Rfl:   •  vitamin D (ERGOCALCIFEROL) 81523 UNITS capsule capsule, Take 50,000 Units by mouth 1 (One) Time  Per Week. As needed. Hasn't started yet., Disp: , Rfl:     Allergies:   Allergies   Allergen Reactions   • Lodine [Etodolac] Delirium         Objective      Vital Signs: There were no vitals filed for this visit.    Ortho Exam:  General: comfortable  Neurologic: sensation to light touch is intact distally; no residual defects noted from the block  Musculoskeletal: Flexion to 90 degrees, extension 5 degrees  Dermatologic: surgical incisions are well appearing, with no drainage or surrounding erythema; no signs or symptoms of infection or DVT    Results Review:  XR Knee 3+ View With Sunrise Left  Knee X-ray    Indication: status-post TKA    Study:  AP, Lateral, and Sunrise views of Left knee    Comparison: Left knee 9/7/2022    Findings:  No signs of acute fracture are visualized  No signs of loosening are appreciated  Components are well aligned    Impression:  Status post Left total knee arthroplasty. No signs of   loosening or fracture.             Assessment / Plan      Assessment:   Diagnoses and all orders for this visit:    1. S/P total knee arthroplasty, left (Primary)  -     Ambulatory Referral to Physical Therapy  -     meloxicam (MOBIC) 15 MG tablet; 1 PO Daily with food.  Stop if you have upset stomach/stomach irritation.  Dispense: 30 tablet; Refill: 1          Plan:  1. Referred to [facility] for outpatient physical therapy.  2. Prescription sent for meloxicam for swelling and pain.    Follow Up:   Return in about 4 weeks (around 12/6/2022) for F/U with Lukasz.        Corina Lagunas PA-C  [facility]    Dictated using Dragon Speech Recognition.

## 2022-11-08 NOTE — PROGRESS NOTES
I have reviewed the notes, assessments, and/or procedures performed by Corina Lagunas PA-C, I concur with her documentation of Bia Mott      Patient seen and examined with Corina this morning.  She has had difficulty getting in with physical therapy.  We have told her that she is within a critical timeframe in order to maximize her range of motion.  She currently has range of motion from 0-90 and would like for her to get to 120 and she needs to be an active participant in the process.  We will see her back in a month to reassess range of motion.

## 2022-11-10 ENCOUNTER — TREATMENT (OUTPATIENT)
Dept: PHYSICAL THERAPY | Facility: CLINIC | Age: 62
End: 2022-11-10

## 2022-11-10 DIAGNOSIS — G89.29 CHRONIC PAIN OF LEFT KNEE: ICD-10-CM

## 2022-11-10 DIAGNOSIS — M25.662 DECREASED RANGE OF MOTION (ROM) OF LEFT KNEE: ICD-10-CM

## 2022-11-10 DIAGNOSIS — Z96.652 S/P TOTAL KNEE ARTHROPLASTY, LEFT: Primary | ICD-10-CM

## 2022-11-10 DIAGNOSIS — M25.562 CHRONIC PAIN OF LEFT KNEE: ICD-10-CM

## 2022-11-10 DIAGNOSIS — R29.898 DECREASED STRENGTH OF LOWER EXTREMITY: ICD-10-CM

## 2022-11-10 PROCEDURE — 97162 PT EVAL MOD COMPLEX 30 MIN: CPT | Performed by: PHYSICAL THERAPIST

## 2022-11-10 PROCEDURE — 97110 THERAPEUTIC EXERCISES: CPT | Performed by: PHYSICAL THERAPIST

## 2022-11-10 NOTE — PROGRESS NOTES
Physical Therapy Initial Evaluation and Plan of Care    Patient: Bia Mott   : 1960  Diagnosis/ICD-10 Code:  S/P total knee arthroplasty, left [Z96.652]  Referring practitioner: Corina Lagunas PA-C  Date of Initial Visit: 11/10/2022  Today's Date: 11/10/2022  Patient seen for 1 session         Visit Diagnoses:    ICD-10-CM ICD-9-CM   1. S/P total knee arthroplasty, left  Z96.652 V43.65   2. Decreased range of motion (ROM) of left knee  M25.662 719.56   3. Decreased strength of lower extremity  R29.898 729.89   4. Chronic pain of left knee  M25.562 719.46    G89.29 338.29         Subjective Questionnaire: LEFS: 36/80=55% impaired      Subjective Evaluation    History of Present Illness  Date of surgery: 2022  Mechanism of injury: Patient reports that she underwent a left total knee arthroplasty on 2022.  Patient reports that she had previously suffered from arthritis for 20+ years, prior to her surgery.  Patient reports that she continues to experience discomfort in her knee, since the surgery.  Patient notes that she feels like her movement is good.  She also continues to experience swelling in her knee and LE, but does use ice at home.  Patient reports that she had been participating with home health for approximately 6 weeks.  Patient currently ambulates with single point cane, but notes that she previously ambulated with no AD.      Patient Occupation:  at [employer]  Current pain ratin  At best pain ratin  At worst pain ratin  Location: L) knee  Quality: burning, knife-like and sharp  Relieving factors: ice, rest and change in position (OTC medication)  Aggravating factors: ambulation, stairs, movement, standing, squatting, prolonged positioning and repetitive movement    Social Support  Lives in: apartment (13 steps to bedroom)  Lives with: adult children    Patient Goals  Patient goals for therapy: decreased pain, increased motion, increased  strength and return to work             Objective          Palpation     Right Tenderness of the rectus femoris, vastus lateralis and vastus medialis.     Tenderness     Right Knee   Tenderness in the inferior patella, lateral joint line, lateral patella, medial patella and superior patella.     Active Range of Motion     Right Knee   Flexion: 96 degrees   Extension: Right knee active extension: lacking 11 degrees from full extension.     Strength/Myotome Testing     Left Hip   Planes of Motion   Flexion: 4-    Right Hip   Planes of Motion   Flexion: 4    Left Knee   Flexion: 3+  Extension: 3+    Right Knee   Flexion: 4  Extension: 3+ (notes that she is needing a knee replacement on the right)    Left Ankle/Foot   Dorsiflexion: 4  Plantar flexion: 4+    Right Ankle/Foot   Dorsiflexion: 4  Plantar flexion: 4+    Swelling     Left Knee Girth Measurement (cm)   Joint line: 37 cm    Right Knee Girth Measurement (cm)   Joint line: 37 cm          Assessment & Plan     Assessment  Impairments: abnormal gait, abnormal or restricted ROM, activity intolerance, impaired balance, impaired physical strength, lacks appropriate home exercise program, pain with function and weight-bearing intolerance  Functional Limitations: sleeping, walking, uncomfortable because of pain, moving in bed, sitting, standing, stooping and unable to perform repetitive tasks  Assessment details: Patient is a 62 year old female who comes to physical therapy for rehabilitation s/p left total knee arthroplasty. The patient presents with increased pain, decreased left knee ROM, and decreased LE strength. She currently displays 11-0-96 degrees left knee AROM.  Patient reports a 55% functional mobility impairment, based on the patient's response to the LEFS.  Patient will benefit from skilled PT, so that patient can achieve maximum level of function.     Prognosis: good    Goals  Plan Goals: SHORT TERM GOALS:  4 weeks       1. Patient will be  independent/complaint with HEP.  2. Patient will report pain no greater than 5/10 when performing self-care activities.  3. Pt to report being able to stand for 10 minutes with pain no greater than 5/10 in the left knee.  4. Patient will demonstrate 5-0-105 degrees left knee ROM to allow for greater ease with ADLs.  5. Patient will ascend/descend 10 steps independently with 1 HR and pain no greater than 5/10 to allow for improved independence at home.    LONG TERM GOALS:   12 weeks  1. Patient will report at least 50/80 on LEFS for improved independence.  2. Patient to demonstrate left LE strength to 4/5 or greater to improve safety with ambulation on uneven surfaces.  3. Patient to report being able to walk for 20 minutes without increasing pain in the left knee.  4. Patient will report no more than 3/10 left knee pain for improved function.  5. Patient will demonstrate 0-110 degrees left knee ROM for improved gait.      Plan  Therapy options: will be seen for skilled therapy services  Planned modality interventions: cryotherapy, thermotherapy (hydrocollator packs) and ultrasound  Planned therapy interventions: manual therapy, ADL retraining, balance/weight-bearing training, neuromuscular re-education, body mechanics training, postural training, soft tissue mobilization, flexibility, functional ROM exercises, strengthening, gait training, stretching, home exercise program, therapeutic activities, IADL retraining, transfer training and joint mobilization  Frequency: 3x week  Duration in weeks: 12  Treatment plan discussed with: patient  Plan details: Moderate Evaluation  27923  Re-evaluation   22350    Therapeutic exercise  89577  Therapeutic activity    26708  Neuromuscular re-education   17058  Manual therapy   91242  Gait training  74383    Moist heat/cryotherapy 87947   Ultrasound   16910            History # of Personal Factors and/or Comorbidities: MODERATE (1-2)  Examination of Body System(s): # of elements:  MODERATE (3)  Clinical Presentation: STABLE   Clinical Decision Making: MODERATE      Timed:         Manual Therapy:         mins  67685;     Therapeutic Exercise:    25     mins  81320;     Neuromuscular Todd:        mins  89835;    Therapeutic Activity:          mins  03050;     Gait Training:           mins  58784;     Ultrasound:          mins  19392;    Ionto                                   mins   81326  Self Care                            mins   68377  Canalith Repos         mins 37690      Un-Timed:  Electrical Stimulation:         mins  28651 (MC );  Dry Needling          mins self-pay  Traction          mins 29127  Low Eval          Mins  83491  Mod Eval       31   Mins  77717  High Eval                            Mins  97877        Timed Treatment:   25   mins   Total Treatment:    56    mins          PT: aMrgaret Frey PT     License Number: [id number]  Electronically signed by Margaret Frey PT, 11/10/22, 11:00 AM EST    Certification Period: 11/10/2022 thru 2/7/2023  I certify that the therapy services are furnished while this patient is under my care.  The services outlined above are required by this patient, and will be reviewed every 90 days.         Physician Signature:__________________________________________________    PHYSICIAN: Corina Lagunas PA-C  NPI: [id number]                                      DATE:      Please sign and return via fax to [email or web address] . Thank you, [facility].

## 2022-11-11 ENCOUNTER — TELEPHONE (OUTPATIENT)
Dept: ORTHOPEDIC SURGERY | Facility: CLINIC | Age: 62
End: 2022-11-11

## 2022-11-14 ENCOUNTER — TREATMENT (OUTPATIENT)
Dept: PHYSICAL THERAPY | Facility: CLINIC | Age: 62
End: 2022-11-14

## 2022-11-14 DIAGNOSIS — M25.662 DECREASED RANGE OF MOTION (ROM) OF LEFT KNEE: ICD-10-CM

## 2022-11-14 DIAGNOSIS — R29.898 DECREASED STRENGTH OF LOWER EXTREMITY: ICD-10-CM

## 2022-11-14 DIAGNOSIS — Z96.652 S/P TOTAL KNEE ARTHROPLASTY, LEFT: Primary | ICD-10-CM

## 2022-11-14 DIAGNOSIS — M25.562 CHRONIC PAIN OF LEFT KNEE: ICD-10-CM

## 2022-11-14 DIAGNOSIS — G89.29 CHRONIC PAIN OF LEFT KNEE: ICD-10-CM

## 2022-11-14 PROCEDURE — 97110 THERAPEUTIC EXERCISES: CPT | Performed by: PHYSICAL THERAPIST

## 2022-11-14 NOTE — PROGRESS NOTES
Physical Therapy Daily Treatment Note      Patient: Bia Mott   : 1960  Referring practitioner: Corina Lagunas PA-C  Date of Initial Visit: Type: THERAPY  Noted: 11/10/2022  Today's Date: 2022  Patient seen for 2 sessions       Visit Diagnoses:    ICD-10-CM ICD-9-CM   1. S/P total knee arthroplasty, left  Z96.652 V43.65   2. Decreased range of motion (ROM) of left knee  M25.662 719.56   3. Decreased strength of lower extremity  R29.898 729.89   4. Chronic pain of left knee  M25.562 719.46    G89.29 338.29       Subjective: Patient arrives to therapy w/ reports of 6/10 left knee pain. Pt states she feels her pain is increased slightly due to the cold weather.      Objective   See Exercise, Manual, and Modality Logs for complete treatment.       Assessment/Plan: Patient responded well to today's session with no reports of knee pain noted following. Treatment consisted of therex as listed with focus on improved left knee ROM, improved left LE strength, and knee stability f/b conclusion of cryotherapy. Pt provided with cues and demonstration during exercise to maintain form, and for max benefit. Pt was limited with standing activities secondary to history and complaints of right knee pain. Pt continues to benefit from therapy services, and will be progressed as tolerated to address goals, improve gait, and reduce pain. No signs of distress or adverse reactions observed during, and/ or following tx. Continue with PT's POC.       Timed:         Manual Therapy:         mins  04776;     Therapeutic Exercise:   44      mins  98901;     Neuromuscular Todd:        mins  15618;    Therapeutic Activity:          mins  37678;     Gait Training:           mins  51972;     Ultrasound:          mins  92191;    Ionto                                   mins   17123  Self Care                            mins   55381  Canalith Repos         mins 70674      Un-Timed:  Electrical Stimulation:         mins  65085 (BEBETO  );  Dry Needling          mins self-pay  Traction          mins 80931      Timed Treatment:   44   mins   Total Treatment:   52     mins (CP: x 8 min)     Nellie Mejia. JANICE Pro  [place] License: [id number]

## 2022-11-16 ENCOUNTER — TREATMENT (OUTPATIENT)
Dept: PHYSICAL THERAPY | Facility: CLINIC | Age: 62
End: 2022-11-16

## 2022-11-16 DIAGNOSIS — M25.562 CHRONIC PAIN OF LEFT KNEE: ICD-10-CM

## 2022-11-16 DIAGNOSIS — M25.662 DECREASED RANGE OF MOTION (ROM) OF LEFT KNEE: ICD-10-CM

## 2022-11-16 DIAGNOSIS — G89.29 CHRONIC PAIN OF LEFT KNEE: ICD-10-CM

## 2022-11-16 DIAGNOSIS — R29.898 DECREASED STRENGTH OF LOWER EXTREMITY: ICD-10-CM

## 2022-11-16 DIAGNOSIS — Z96.652 S/P TOTAL KNEE ARTHROPLASTY, LEFT: Primary | ICD-10-CM

## 2022-11-16 PROCEDURE — 97530 THERAPEUTIC ACTIVITIES: CPT | Performed by: PHYSICAL THERAPIST

## 2022-11-16 PROCEDURE — 97110 THERAPEUTIC EXERCISES: CPT | Performed by: PHYSICAL THERAPIST

## 2022-11-16 NOTE — PROGRESS NOTES
"Physical Therapy Daily Treatment Note      Patient: Bia Mott   : 1960  Referring practitioner: Corina Lagunas PA-C  Date of Initial Visit: Type: THERAPY  Noted: 11/10/2022  Today's Date: 2022  Patient seen for 3 sessions       Visit Diagnoses:    ICD-10-CM ICD-9-CM   1. S/P total knee arthroplasty, left  Z96.652 V43.65   2. Decreased range of motion (ROM) of left knee  M25.662 719.56   3. Decreased strength of lower extremity  R29.898 729.89   4. Chronic pain of left knee  M25.562 719.46    G89.29 338.29       Subjective:  Patient reports 4/10 left knee pain prior to tx. Pt states her knee feels \"stiff\" today. Otherwise pt notes of no new complaints.     Objective          Active Range of Motion   Left Knee   Flexion: 102 (supine) degrees       See Exercise, Manual, and Modality Logs for complete treatment.       Assessment/Plan: Patient tolerated treatment well today with no complaints of pain increase noted following. Session consisted of therex and therapeutic activities as listed f/b gait training w/ assistive device and modalities following. Exercise progressed with strengthening and range of motion repetitions increased, as tolerated. Gait training performed w/ single point cane with cues given for improved reciprocal movement and vashti. Cryotherapy applied at conclusion. Pt demonstrated improved knee flexion range of motion since initial evaluation w/ measurements listed above. Pt continues to benefit from therapy services, and will be progressed as tolerated. No signs of distress or adverse reactions observed during, and/ or following tx. Continue with PT's POC.       Timed:         Manual Therapy:         mins  13883;     Therapeutic Exercise:   30      mins  97041;     Neuromuscular Todd:        mins  50276;    Therapeutic Activity:    10      mins  47063;     Gait Trainin      mins  59121;     Ultrasound:          mins  77129;    Ionto                                   mins "   50170  Self Care                            mins   48251  Canalith Repos         mins 64690      Un-Timed:  Electrical Stimulation:         mins  69497 ( );  Dry Needling          mins self-pay  Traction          mins 12036      Timed Treatment:  45    mins   Total Treatment:    51    mins (CP: x 6 min)    Nellie Mejia. JANICE Pro  [place] License: [id number]

## 2022-11-18 ENCOUNTER — TREATMENT (OUTPATIENT)
Dept: PHYSICAL THERAPY | Facility: CLINIC | Age: 62
End: 2022-11-18

## 2022-11-18 DIAGNOSIS — G89.29 CHRONIC PAIN OF LEFT KNEE: ICD-10-CM

## 2022-11-18 DIAGNOSIS — M25.562 CHRONIC PAIN OF LEFT KNEE: ICD-10-CM

## 2022-11-18 DIAGNOSIS — Z96.652 S/P TOTAL KNEE ARTHROPLASTY, LEFT: Primary | ICD-10-CM

## 2022-11-18 DIAGNOSIS — R29.898 DECREASED STRENGTH OF LOWER EXTREMITY: ICD-10-CM

## 2022-11-18 DIAGNOSIS — M25.662 DECREASED RANGE OF MOTION (ROM) OF LEFT KNEE: ICD-10-CM

## 2022-11-18 PROCEDURE — 97110 THERAPEUTIC EXERCISES: CPT | Performed by: PHYSICAL THERAPIST

## 2022-11-18 PROCEDURE — 97530 THERAPEUTIC ACTIVITIES: CPT | Performed by: PHYSICAL THERAPIST

## 2022-11-18 NOTE — PROGRESS NOTES
Physical Therapy Daily Treatment Note      Patient: Bia Mott   : 1960  Referring practitioner: Corina Lagunas PA-C  Date of Initial Visit: Type: THERAPY  Noted: 11/10/2022  Today's Date: 2022  Patient seen for 4 sessions       Visit Diagnoses:    ICD-10-CM ICD-9-CM   1. S/P total knee arthroplasty, left  Z96.652 V43.65   2. Decreased range of motion (ROM) of left knee  M25.662 719.56   3. Decreased strength of lower extremity  R29.898 729.89   4. Chronic pain of left knee  M25.562 719.46    G89.29 338.29       Subjective: Patient states she feels her knee has improved since beginning therapy. Pt reports her knee is stiff this morning, due to the colder weather. 6/10 left knee pain prior to tx.     Objective   See Exercise, Manual, and Modality Logs for complete treatment.       Assessment/Plan: Patient responded well to today's session with no reports of pain noted following. Treatment performed including therex and therapeutic activities as listed to assist with knee range of motion deficits, improve left LE strength, and knee stability. Exercise progressed with resistance of theraband increased from red to green, and prone knee hangs initiated. Pt noted with some soreness, however tolerable. Pt educated on importance of performing static knee extension to address deficit; pt verbalized understanding. Pt provided with cues and demonstration for good form, and for max benefit. Cryotherapy applied at conclusion. Pt will be progressed with therapy as tolerated to address goals and improve mobility. No signs of distress or adverse reactions observed during, and/ or following tx. Continue with PT's POC.       Timed:         Manual Therapy:         mins  77307;     Therapeutic Exercise:   41      mins  46132;     Neuromuscular Todd:        mins  11634;    Therapeutic Activity:     13     mins  04864;     Gait Training:           mins  05167;     Ultrasound:          mins  67573;    Ionto                                    mins   85771  Self Care                            mins   88830  Canalith Repos         mins 15452      Un-Timed:  Electrical Stimulation:         mins  33178 ( );  Dry Needling          mins self-pay  Traction          mins 67185      Timed Treatment:  54    mins   Total Treatment:    62    mins (CP: x 8 min)    Nellie Mejia. JANICE Pro  [place] License: [id number]

## 2022-11-21 ENCOUNTER — TREATMENT (OUTPATIENT)
Dept: PHYSICAL THERAPY | Facility: CLINIC | Age: 62
End: 2022-11-21

## 2022-11-21 DIAGNOSIS — M25.662 DECREASED RANGE OF MOTION (ROM) OF LEFT KNEE: ICD-10-CM

## 2022-11-21 DIAGNOSIS — R29.898 DECREASED STRENGTH OF LOWER EXTREMITY: ICD-10-CM

## 2022-11-21 DIAGNOSIS — Z96.652 S/P TOTAL KNEE ARTHROPLASTY, LEFT: Primary | ICD-10-CM

## 2022-11-21 DIAGNOSIS — G89.29 CHRONIC PAIN OF LEFT KNEE: ICD-10-CM

## 2022-11-21 DIAGNOSIS — M25.562 CHRONIC PAIN OF LEFT KNEE: ICD-10-CM

## 2022-11-21 PROCEDURE — 97110 THERAPEUTIC EXERCISES: CPT | Performed by: PHYSICAL THERAPIST

## 2022-11-21 PROCEDURE — 97530 THERAPEUTIC ACTIVITIES: CPT | Performed by: PHYSICAL THERAPIST

## 2022-11-21 NOTE — PROGRESS NOTES
Physical Therapy Daily Treatment Note      Patient: Bia Mott   : 1960  Referring practitioner: Corina Lagunas PA-C  Date of Initial Visit: Type: THERAPY  Noted: 11/10/2022  Today's Date: 2022  Patient seen for 5 sessions       Visit Diagnoses:    ICD-10-CM ICD-9-CM   1. S/P total knee arthroplasty, left  Z96.652 V43.65   2. Decreased range of motion (ROM) of left knee  M25.662 719.56   3. Decreased strength of lower extremity  R29.898 729.89   4. Chronic pain of left knee  M25.562 719.46    G89.29 338.29       Subjective: Patient arrives to therapy with reports of increased left knee pain. Pt states most of the soreness is near the top of her scar/ incisional area. Pt feels she may have over done it with the exercises this weekend, and walking some without her cane in the home.     Objective          Active Range of Motion   Left Knee   Flexion: 105 (supine) degrees   Extension: Left knee active extension: lacks 7 degrees from full extension.       See Exercise, Manual, and Modality Logs for complete treatment.       Assessment/Plan: Supervising therapist, Margaret Frey, PT, DPT notified and aware of pt's subjective reports. PT assessed pt's knee with no bruising, edema or positive findings noted. PT and PTA educated patient to reduce activity, ice and continue to monitor symptoms. Pt verbalized understanding. Treatment performed including therex and therapeutic activities as listed to address knee ROM and strength deficits, and improve stability. Exercise reduced on this date secondary to pt's increased pain upon arrival. Cryotherapy applied at conclusion. Pt continues to benefit from therapy services, and will be progressed as tolerated to address goals, reduce pain, and restore mobility. No signs of distress or adverse reactions observed during, and/ or following tx. Continue with PT's POC.        Timed:         Manual Therapy:         mins  51239;     Therapeutic Exercise:    36     mins   26868;     Neuromuscular Todd:        mins  55277;    Therapeutic Activity:      10    mins  15171;     Gait Training:           mins  61843;     Ultrasound:          mins  81775;    Ionto                                   mins   46996  Self Care                            mins   44555  Canalith Repos         mins 37313      Un-Timed:  Electrical Stimulation:         mins  20294 ( );  Dry Needling          mins self-pay  Traction          mins 23160      Timed Treatment:   46   mins   Total Treatment:    54    mins (CP: x 8 min)    Nellie Mejia. JANICE Pro  [place] License: [id number]

## 2022-11-23 ENCOUNTER — TREATMENT (OUTPATIENT)
Dept: PHYSICAL THERAPY | Facility: CLINIC | Age: 62
End: 2022-11-23

## 2022-11-23 DIAGNOSIS — G89.29 CHRONIC PAIN OF LEFT KNEE: ICD-10-CM

## 2022-11-23 DIAGNOSIS — R29.898 DECREASED STRENGTH OF LOWER EXTREMITY: ICD-10-CM

## 2022-11-23 DIAGNOSIS — M25.562 CHRONIC PAIN OF LEFT KNEE: ICD-10-CM

## 2022-11-23 DIAGNOSIS — Z96.652 S/P TOTAL KNEE ARTHROPLASTY, LEFT: Primary | ICD-10-CM

## 2022-11-23 DIAGNOSIS — M25.662 DECREASED RANGE OF MOTION (ROM) OF LEFT KNEE: ICD-10-CM

## 2022-11-23 PROCEDURE — 97110 THERAPEUTIC EXERCISES: CPT | Performed by: PHYSICAL THERAPIST

## 2022-11-23 PROCEDURE — 97530 THERAPEUTIC ACTIVITIES: CPT | Performed by: PHYSICAL THERAPIST

## 2022-11-23 NOTE — PROGRESS NOTES
Physical Therapy Daily Treatment Note      Patient: Bia Mott   : 1960  Referring practitioner: Corina Lagunas PA-C  Date of Initial Visit: Type: THERAPY  Noted: 11/10/2022  Today's Date: 2022  Patient seen for 6 sessions       Visit Diagnoses:    ICD-10-CM ICD-9-CM   1. S/P total knee arthroplasty, left  Z96.652 V43.65   2. Decreased range of motion (ROM) of left knee  M25.662 719.56   3. Decreased strength of lower extremity  R29.898 729.89   4. Chronic pain of left knee  M25.562 719.46    G89.29 338.29       Subjective: Patient reports she has been resting her knee and it feels much better. Pt states she still has some tightness at the top of her scar, but feels it is due to scar tissue. 3/10 pain pre tx.      Objective   See Exercise, Manual, and Modality Logs for complete treatment.       Assessment/Plan: Patient responded well to today's session with no reports of knee pain noted following. Pt completed therex and therapeutic activities as listed with continued focus on restoring left knee ROM, improving strength, and to assist with ease of difficulty performing ADL's. Exercise progressed with repetitions increased as tolerated, as well as standing hip adduction initiated. Pt continues to require cues during gait for improved knee flexion and heel strike on left, as well as improved reciprocal movement. Cryotherapy applied at conclusion. Pt will be progressed with therapy as tolerated to address goals, improve strength, and restore mobility. No signs of distress or adverse reactions observed during, and/ or following tx. Continue with PT's POC.       Timed:         Manual Therapy:         mins  95261;     Therapeutic Exercise:   36      mins  04152;     Neuromuscular Todd:        mins  74644;    Therapeutic Activity:     11     mins  45916;     Gait Training:           mins  93742;     Ultrasound:          mins  93707;    Ionto                                   mins   87380  Self Care                             mins   62958  Canalith Repos         mins 54717      Un-Timed:  Electrical Stimulation:         mins  92490 ( );  Dry Needling          mins self-pay  Traction          mins 60561      Timed Treatment:  47    mins   Total Treatment:    55    mins (CP: x 8 min)     Nellie Mejia. JANICE Pro  [place] License: [id number]

## 2022-11-28 ENCOUNTER — TREATMENT (OUTPATIENT)
Dept: PHYSICAL THERAPY | Facility: CLINIC | Age: 62
End: 2022-11-28

## 2022-11-28 DIAGNOSIS — G89.29 CHRONIC PAIN OF LEFT KNEE: ICD-10-CM

## 2022-11-28 DIAGNOSIS — M25.562 CHRONIC PAIN OF LEFT KNEE: ICD-10-CM

## 2022-11-28 DIAGNOSIS — M25.662 DECREASED RANGE OF MOTION (ROM) OF LEFT KNEE: ICD-10-CM

## 2022-11-28 DIAGNOSIS — Z96.652 S/P TOTAL KNEE ARTHROPLASTY, LEFT: Primary | ICD-10-CM

## 2022-11-28 DIAGNOSIS — R29.898 DECREASED STRENGTH OF LOWER EXTREMITY: ICD-10-CM

## 2022-11-28 PROCEDURE — 97110 THERAPEUTIC EXERCISES: CPT | Performed by: PHYSICAL THERAPIST

## 2022-11-28 NOTE — PROGRESS NOTES
Physical Therapy Daily Treatment Note      Patient: Bia Mott   : 1960  Referring practitioner: Corina Lagunas PA-C  Date of Initial Visit: Type: THERAPY  Noted: 11/10/2022  Today's Date: 2022  Patient seen for 7 sessions       Visit Diagnoses:    ICD-10-CM ICD-9-CM   1. S/P total knee arthroplasty, left  Z96.652 V43.65   2. Decreased range of motion (ROM) of left knee  M25.662 719.56   3. Decreased strength of lower extremity  R29.898 729.89   4. Chronic pain of left knee  M25.562 719.46    G89.29 338.29       Subjective: Patient reports 3/10 left knee pain prior to tx. Pt states she must have slept wrong last night because her hip is bothering her. Pt notes she continues to have difficulty with walking without deviations due to right knee pain and discomfort.      Objective   See Exercise, Manual, and Modality Logs for complete treatment.       Assessment/Plan: Patient tolerated treatment well today with no reports of pain increase noted following, 3/10. Pt performed therex and therapeutic activities as listed with continued focus on improved right knee ROM, improved strength, and to assist with deviations including stair climbing. Exercise progressed with LE bike initiated to assist with improving knee flexion. Pt observed with good tolerance. Pt educated on importance of continuation of home program including knee flexion and prone knee hang to achieve maximum gains. Pt verbalized understanding. Cryotherapy applied at conclusion. Pt continues to benefit from therapy services, and will be progressed as tolerated. No signs of distress or adverse reactions observed during, and/ or following tx. Continue with PT's POC.       Timed:         Manual Therapy:         mins  82629;     Therapeutic Exercise:    40     mins  14858;     Neuromuscular Todd:        mins  63275;    Therapeutic Activity:     9     mins  43464;     Gait Training:           mins  18431;     Ultrasound:          mins  49452;     Ionto                                   mins   22930  Self Care                            mins   13985  Canalith Repos         mins 90848      Un-Timed:  Electrical Stimulation:         mins  81052 ( );  Dry Needling          mins self-pay  Traction          mins 75015      Timed Treatment:  49    mins   Total Treatment:    57    mins (CP: x 8 min)     Nellie eMjia. Morteza, JANICE  [place] License: [id number]

## 2022-11-30 ENCOUNTER — TREATMENT (OUTPATIENT)
Dept: PHYSICAL THERAPY | Facility: CLINIC | Age: 62
End: 2022-11-30

## 2022-11-30 DIAGNOSIS — G89.29 CHRONIC PAIN OF LEFT KNEE: ICD-10-CM

## 2022-11-30 DIAGNOSIS — M25.662 DECREASED RANGE OF MOTION (ROM) OF LEFT KNEE: ICD-10-CM

## 2022-11-30 DIAGNOSIS — M25.562 CHRONIC PAIN OF LEFT KNEE: ICD-10-CM

## 2022-11-30 DIAGNOSIS — Z96.652 S/P TOTAL KNEE ARTHROPLASTY, LEFT: Primary | ICD-10-CM

## 2022-11-30 DIAGNOSIS — R29.898 DECREASED STRENGTH OF LOWER EXTREMITY: ICD-10-CM

## 2022-11-30 PROCEDURE — 97530 THERAPEUTIC ACTIVITIES: CPT | Performed by: PHYSICAL THERAPIST

## 2022-11-30 PROCEDURE — 97110 THERAPEUTIC EXERCISES: CPT | Performed by: PHYSICAL THERAPIST

## 2022-11-30 NOTE — PROGRESS NOTES
Physical Therapy Daily Treatment Note      Patient: Bia Mott   : 1960  Referring practitioner: Corina Lagunas PA-C  Date of Initial Visit: Type: THERAPY  Noted: 11/10/2022  Today's Date: 2022  Patient seen for 8 sessions       Visit Diagnoses:    ICD-10-CM ICD-9-CM   1. S/P total knee arthroplasty, left  Z96.652 V43.65   2. Decreased range of motion (ROM) of left knee  M25.662 719.56   3. Decreased strength of lower extremity  R29.898 729.89   4. Chronic pain of left knee  M25.562 719.46    G89.29 338.29       Subjective: Patient states of no knee pain this morning, stiffness only. Pt reports she had difficulty sleeping last night due to knee discomfort.     Objective          Active Range of Motion   Left Knee   Flexion: Left knee active flexion: AAROM: 107.       See Exercise, Manual, and Modality Logs for complete treatment.       Assessment/Plan: Patient tolerated treatment well today w/ no reports of knee pain noted following. Exercise performed including therex and therapeutic activities as listed with continued focus on restoring knee ROM, improving strength, and to assist with return of function. Activities progressed including total gym squats. Pt provided with assistance, and cues for form and mechanics. Pt demonstrated improved active assistive knee ROM, as noted above. Cryotherapy applied at conclusion. Pt continues to benefit from therapy services, and will be progressed as tolerated to address goals, reduce pain, and improve ROM. No adverse reactions observed during, and/ or following tx. Continue with PT's POC.        Timed:         Manual Therapy:         mins  87397;     Therapeutic Exercise:     39    mins  36370;     Neuromuscular Todd:        mins  85840;    Therapeutic Activity:     15     mins  53895;     Gait Training:           mins  99254;     Ultrasound:          mins  12997;    Ionto                                   mins   05360  Self Care                             mins   64171  Canalith Repos         mins 63461      Un-Timed:  Electrical Stimulation:         mins  53606 ( );  Dry Needling          mins self-pay  Traction          mins 45554      Timed Treatment:  54    mins   Total Treatment:    60    mins (CP: x 6 min)     Nellie Mejia. JANICE Pro  [place] License: [id number]

## 2022-12-02 ENCOUNTER — TREATMENT (OUTPATIENT)
Dept: PHYSICAL THERAPY | Facility: CLINIC | Age: 62
End: 2022-12-02

## 2022-12-02 DIAGNOSIS — R29.898 DECREASED STRENGTH OF LOWER EXTREMITY: ICD-10-CM

## 2022-12-02 DIAGNOSIS — M25.562 CHRONIC PAIN OF LEFT KNEE: ICD-10-CM

## 2022-12-02 DIAGNOSIS — M25.662 DECREASED RANGE OF MOTION (ROM) OF LEFT KNEE: ICD-10-CM

## 2022-12-02 DIAGNOSIS — Z96.652 S/P TOTAL KNEE ARTHROPLASTY, LEFT: Primary | ICD-10-CM

## 2022-12-02 DIAGNOSIS — G89.29 CHRONIC PAIN OF LEFT KNEE: ICD-10-CM

## 2022-12-02 PROCEDURE — 97110 THERAPEUTIC EXERCISES: CPT | Performed by: PHYSICAL THERAPIST

## 2022-12-02 PROCEDURE — 97530 THERAPEUTIC ACTIVITIES: CPT | Performed by: PHYSICAL THERAPIST

## 2022-12-02 NOTE — PROGRESS NOTES
Physical Therapy Daily Treatment Note      Patient: Bia Mott   : 1960  Referring practitioner: Corina Lagunas PA-C  Date of Initial Visit: Type: THERAPY  Noted: 11/10/2022  Today's Date: 2022  Patient seen for 9 sessions       Visit Diagnoses:    ICD-10-CM ICD-9-CM   1. S/P total knee arthroplasty, left  Z96.652 V43.65   2. Decreased range of motion (ROM) of left knee  M25.662 719.56   3. Decreased strength of lower extremity  R29.898 729.89   4. Chronic pain of left knee  M25.562 719.46    G89.29 338.29       Subjective Evaluation    History of Present Illness    Subjective comment: Pt reports having no pain today.       Objective   See Exercise, Manual, and Modality Logs for complete treatment.       Assessment & Plan     Assessment    Assessment details: Tx today consisted of exercises for improved knee mobility and stability; functional standing activities and ended with ice.  Pt demonstrated good effort and responded well to added reps with exercise.  Pt denies pain following session today.    Plan  Plan details: Will follow per protocol for max gains.          Timed:         Manual Therapy:         mins  43326;     Therapeutic Exercise:    40     mins  63640;     Neuromuscular Todd:        mins  37599;    Therapeutic Activity:     14     mins  50569;     Gait Training:           mins  25909;     Ultrasound:          mins  61412;    Ionto                                   mins   92000  Self Care                            mins   06039  Canalith Repos         mins 05623      Un-Timed:  Electrical Stimulation:         mins  21994 (MC );  Dry Needling          mins self-pay  Traction          mins 89982      Timed Treatment:   54   mins   Total Treatment:     60   Mins(6min ice)    Rj Cabral PT  [place] License: [id number]      Electronically signed by Rj Cabral PT, 22, 10:00 AM EST

## 2022-12-05 ENCOUNTER — TREATMENT (OUTPATIENT)
Dept: PHYSICAL THERAPY | Facility: CLINIC | Age: 62
End: 2022-12-05

## 2022-12-05 DIAGNOSIS — Z96.652 S/P TOTAL KNEE ARTHROPLASTY, LEFT: Primary | ICD-10-CM

## 2022-12-05 DIAGNOSIS — R29.898 DECREASED STRENGTH OF LOWER EXTREMITY: ICD-10-CM

## 2022-12-05 DIAGNOSIS — M25.662 DECREASED RANGE OF MOTION (ROM) OF LEFT KNEE: ICD-10-CM

## 2022-12-05 DIAGNOSIS — G89.29 CHRONIC PAIN OF LEFT KNEE: ICD-10-CM

## 2022-12-05 DIAGNOSIS — M25.562 CHRONIC PAIN OF LEFT KNEE: ICD-10-CM

## 2022-12-05 PROCEDURE — 97110 THERAPEUTIC EXERCISES: CPT | Performed by: PHYSICAL THERAPIST

## 2022-12-05 PROCEDURE — 97530 THERAPEUTIC ACTIVITIES: CPT | Performed by: PHYSICAL THERAPIST

## 2022-12-05 NOTE — PROGRESS NOTES
Physical Therapy Daily Treatment Note      Patient: Bia Mott   : 1960  Referring practitioner: Corina Lagunas PA-C  Date of Initial Visit: Type: THERAPY  Noted: 11/10/2022  Today's Date: 2022  Patient seen for 10 sessions       Visit Diagnoses:    ICD-10-CM ICD-9-CM   1. S/P total knee arthroplasty, left  Z96.652 V43.65   2. Decreased range of motion (ROM) of left knee  M25.662 719.56   3. Decreased strength of lower extremity  R29.898 729.89   4. Chronic pain of left knee  M25.562 719.46    G89.29 338.29       Subjective Evaluation    History of Present Illness    Subjective comment: Patient reports that she has no pain.  She states that she has been walking around her house some without her cane.Pain  Current pain ratin           Objective   See Exercise, Manual, and Modality Logs for complete treatment.       Assessment & Plan     Assessment    Assessment details: Patient tolerated today's session well, with reports of 0/10 pain pre- and post-tx.  There ex and therapeutic activities continued to focus on knee ROM, LE strengthening, and stabilization.  Patient gave good effort during session, but was limited secondary to right knee pain.  Treatment concluded with cryotherapy.  She will continue to be progressed per her tolerance and POC.          Timed:         Manual Therapy:         mins  97213;     Therapeutic Exercise:    40     mins  59949;     Neuromuscular Todd:        mins  97289;    Therapeutic Activity:     15     mins  55496;     Gait Training:           mins  93450;     Ultrasound:          mins  24206;    Ionto                                   mins   70731  Self Care                            mins   57181  Canalith Repos         mins 83569      Un-Timed:  Electrical Stimulation:         mins  32315 ( );  Dry Needling          mins self-pay  Traction          mins 85077  Ice-8 min    Timed Treatment:   55   mins   Total Treatment:     63   mins    Margaret Frey,  PT  [place] License: [id number]  Electronically signed by Margaret Frey, PT, 12/05/22, 1:06 PM EST.

## 2022-12-06 ENCOUNTER — OFFICE VISIT (OUTPATIENT)
Dept: ORTHOPEDIC SURGERY | Facility: CLINIC | Age: 62
End: 2022-12-06

## 2022-12-06 DIAGNOSIS — Z96.652 S/P TOTAL KNEE ARTHROPLASTY, LEFT: Primary | ICD-10-CM

## 2022-12-06 PROCEDURE — 99024 POSTOP FOLLOW-UP VISIT: CPT | Performed by: ORTHOPAEDIC SURGERY

## 2022-12-06 NOTE — PROGRESS NOTES
[facility] Note        Subjective     Post-op (1 month follow up; 3 months S/P TOTAL KNEE ARTHROPLASTY- LEFT DOS:  09/7/2022)       IDANIA Mott is a 62 y.o. female.  Patient is right at the 3-month point from left total knee arthroplasty on 9/7/2022.  She is doing better overall.  She is doing outpatient PT.  Eager to get back to work.          Objective      Physical Exam  There were no vitals taken for this visit.    There is no height or weight on file to calculate BMI.        Ortho Exam  Range of motion 0-110.  Calf soft and nontender.  Incision healing and free of erythema or drainage.    Imaging Reviewed:  Imaging Results (Last 24 Hours)     ** No results found for the last 24 hours. **        Radiographs are taken today in the office of her press-fit implant which show good incorporation overall and stable appearance.    Assessment    Assessment:  1. S/P total knee arthroplasty, left        Plan:  1. Status post left total knee arthroplasty--follow-up in 3 months.  Continue indefinite antibiotic prophylaxis.  She like to wait on the right side until about a year.  She can go back to work with restrictions.  Specifically, no lifting greater than 10 pounds.  She needs to take a break every 15 minutes and be allowed to sit.  She can work full-time however.      Sunny Lal MD  12/06/22  10:28 EST      Dictated Utilizing Dragon Dictation.

## 2022-12-07 ENCOUNTER — TELEPHONE (OUTPATIENT)
Dept: ORTHOPEDIC SURGERY | Facility: CLINIC | Age: 62
End: 2022-12-07

## 2022-12-07 ENCOUNTER — TREATMENT (OUTPATIENT)
Dept: PHYSICAL THERAPY | Facility: CLINIC | Age: 62
End: 2022-12-07

## 2022-12-07 DIAGNOSIS — R29.898 DECREASED STRENGTH OF LOWER EXTREMITY: ICD-10-CM

## 2022-12-07 DIAGNOSIS — Z96.652 S/P TOTAL KNEE ARTHROPLASTY, LEFT: Primary | ICD-10-CM

## 2022-12-07 DIAGNOSIS — M25.562 CHRONIC PAIN OF LEFT KNEE: ICD-10-CM

## 2022-12-07 DIAGNOSIS — G89.29 CHRONIC PAIN OF LEFT KNEE: ICD-10-CM

## 2022-12-07 DIAGNOSIS — M25.662 DECREASED RANGE OF MOTION (ROM) OF LEFT KNEE: ICD-10-CM

## 2022-12-07 PROCEDURE — 97110 THERAPEUTIC EXERCISES: CPT | Performed by: PHYSICAL THERAPIST

## 2022-12-07 PROCEDURE — 97530 THERAPEUTIC ACTIVITIES: CPT | Performed by: PHYSICAL THERAPIST

## 2022-12-07 NOTE — PROGRESS NOTES
Physical Therapy Re Certification Of Plan of Care  Patient: Bia Mott   : 1960  Diagnosis/ICD-10 Code:  S/P total knee arthroplasty, left [Z96.652]  Referring practitioner: Corina Lagunas PA-C  Date of Initial Visit: Type: THERAPY  Noted: 11/10/2022  Today's Date: 2022  Patient seen for 11 sessions         Visit Diagnoses:    ICD-10-CM ICD-9-CM   1. S/P total knee arthroplasty, left  Z96.652 V43.65   2. Decreased range of motion (ROM) of left knee  M25.662 719.56   3. Decreased strength of lower extremity  R29.898 729.89   4. Chronic pain of left knee  M25.562 719.46    G89.29 338.29         Bia Mott reports:   Subjective Questionnaire: LEFS: 34%  Clinical Progress: improved  Home Program Compliance: Yes  Treatment has included: therapeutic exercise, neuromuscular re-education, manual therapy, therapeutic activity, gait training, electrical stimulation, ultrasound, moist heat and cryotherapy      Subjective Evaluation    History of Present Illness    Subjective comment: Pt reports therapy has helped with improved walking, stair climbing and ADLs.Pain  Current pain ratin  At best pain ratin  At worst pain ratin             Objective          Active Range of Motion     Additional Active Range of Motion Details  Left knee lacks 3 degrees from neutral and 105 degrees of flexion    Strength/Myotome Testing     Left Knee   Flexion: 4+  Extension: 4+    Right Knee   Flexion: 4+  Extension: 4+    Additional Strength Details  Left hip flex 4-/5    Ambulation     Comments   Pt amb with improved stance on left LE with use of cane; pt demonstrated increased velocity          Assessment & Plan     Assessment  Impairments: abnormal gait, abnormal or restricted ROM, activity intolerance, impaired balance, impaired physical strength, lacks appropriate home exercise program, pain with function and weight-bearing intolerance  Functional Limitations: sleeping, walking, uncomfortable because of  pain, moving in bed, sitting, standing, stooping and unable to perform repetitive tasks  Assessment details: Patient is a 62 year old female who comes to physical therapy for rehabilitation s/p left total knee arthroplasty. Pt has attended 11 sessions with noted improvements with knee mobility, strength and decreased pain. Pt improved her LEFS score from 55% to 34%.  Pt is motivated to cont and will benefit from further care for improved knee stability and function.  Prognosis: good    Goals  Plan Goals: SHORT TERM GOALS:  4 weeks       1. Patient will be independent/complaint with HEP.met  2. Patient will report pain no greater than 5/10 when performing self-care activities.met  3. Pt to report being able to stand for 10 minutes with pain no greater than 5/10 in the left knee.met  4. Patient will demonstrate 5-0-105 degrees left knee ROM to allow for greater ease with ADLs.met  5. Patient will ascend/descend 10 steps independently with 1 HR and pain no greater than 5/10 to allow for improved independence at home.met    LONG TERM GOALS:   12 weeks  1. Patient will report at least 50/80 on LEFS for improved independence.met  2. Patient to demonstrate left LE strength to 4/5 or greater to improve safety with ambulation on uneven surfaces.met  3. Patient to report being able to walk for 20 minutes without increasing pain in the left knee.progressing  4. Patient will report no more than 3/10 left knee pain for improved function.not met  5. Patient will demonstrate 0-110 degrees left knee ROM for improved gait.not met      Plan  Therapy options: will be seen for skilled therapy services  Planned modality interventions: cryotherapy, thermotherapy (hydrocollator packs) and ultrasound  Planned therapy interventions: manual therapy, ADL retraining, balance/weight-bearing training, neuromuscular re-education, body mechanics training, postural training, soft tissue mobilization, flexibility, functional ROM exercises,  strengthening, gait training, stretching, home exercise program, therapeutic activities, IADL retraining, transfer training and joint mobilization  Frequency: 2x week  Duration in weeks: 8  Treatment plan discussed with: patient  Plan details: Moderate Evaluation  80730  Re-evaluation   28667    Therapeutic exercise  00766  Therapeutic activity    28884  Neuromuscular re-education   49078  Manual therapy   62583  Gait training  92954    Moist heat/cryotherapy 44851   Ultrasound   81620               Recommendations: Continue as planned  Timeframe: 2 months  Prognosis to achieve goals: good      Timed:         Manual Therapy:         mins  01949;     Therapeutic Exercise:    33     mins  81570;     Neuromuscular Todd:        mins  91057;    Therapeutic Activity:     12     mins  67350;     Gait Training:           mins  74275;     Ultrasound:          mins  51963;    Ionto                                   mins   93822  Self Care                            mins   10621  Canalith Repos         mins 42724      Un-Timed:  Electrical Stimulation:         mins  09305 ( );  Dry Needling          mins self-pay  Traction          mins 51246  Re-Eval                               mins  53111      Timed Treatment:   45   mins   Total Treatment:     51   Mins(6 min ice)          PT: Rj Cabral PT     [place] License:  [id number]    Electronically signed by Rj Cabral PT, 12/07/22, 10:06 AM EST    Certification Period: 12/7/2022 thru 3/6/2023  I certify that the therapy services are furnished while this patient is under my care.  The services outlined above are required by this patient, and will be reviewed every 90 days.         Physician Signature:__________________________________________________    PHYSICIAN: Corina Lagunas PA-C  NPI: [id number]                                      DATE:  :     Please sign and return via fax to [email or web address] . Thank you, [facility]

## 2022-12-07 NOTE — TELEPHONE ENCOUNTER
Left vm to see if one page or all of [employer] form needs completion and faxing. Ask for Phyllis SAENZ in clinic or send a message on what patient specifically needs.

## 2022-12-12 ENCOUNTER — TREATMENT (OUTPATIENT)
Dept: PHYSICAL THERAPY | Facility: CLINIC | Age: 62
End: 2022-12-12

## 2022-12-12 DIAGNOSIS — R29.898 DECREASED STRENGTH OF LOWER EXTREMITY: ICD-10-CM

## 2022-12-12 DIAGNOSIS — Z96.652 S/P TOTAL KNEE ARTHROPLASTY, LEFT: Primary | ICD-10-CM

## 2022-12-12 DIAGNOSIS — M25.562 CHRONIC PAIN OF LEFT KNEE: ICD-10-CM

## 2022-12-12 DIAGNOSIS — G89.29 CHRONIC PAIN OF LEFT KNEE: ICD-10-CM

## 2022-12-12 DIAGNOSIS — M25.662 DECREASED RANGE OF MOTION (ROM) OF LEFT KNEE: ICD-10-CM

## 2022-12-12 PROCEDURE — 97530 THERAPEUTIC ACTIVITIES: CPT | Performed by: PHYSICAL THERAPIST

## 2022-12-12 PROCEDURE — 97110 THERAPEUTIC EXERCISES: CPT | Performed by: PHYSICAL THERAPIST

## 2022-12-12 NOTE — PROGRESS NOTES
Physical Therapy Daily Treatment Note      Patient: Bia Mott   : 1960  Referring practitioner: Corina Lagunas PA-C  Date of Initial Visit: Type: THERAPY  Noted: 11/10/2022  Today's Date: 2022  Patient seen for 12 sessions       Visit Diagnoses:    ICD-10-CM ICD-9-CM   1. S/P total knee arthroplasty, left  Z96.652 V43.65   2. Decreased range of motion (ROM) of left knee  M25.662 719.56   3. Decreased strength of lower extremity  R29.898 729.89   4. Chronic pain of left knee  M25.562 719.46    G89.29 338.29       Subjective: Patient states of no pain in the left knee upon arrival to therapy. Pt reports it is her right knee that is bothering her. Pt states she returned to work on Thursday of last week and done well, however had some soreness along the outside of the knee.      Objective          Active Range of Motion   Left Knee   Flexion: Left knee active flexion:  AAROM: 110.       See Exercise, Manual, and Modality Logs for complete treatment.       Assessment/Plan: Patient responded well to today's session with no reports of knee pain noted following. Pt performed therex and therapeutic activities as listed with continued focus on improved left knee ROM, improved left LE strength f/b manual rx including scar and soft tissue mobilization. Exercise progressed with TKE with tband initiated and level on total gym increased. Pt observed with good tolerance, and was provided with cues for improved static stretch during knee flexion, and to slow pace during exercise for max gains. Pt noted with muscular tightness along superior scar and quad during manual therapy. Cryotherapy at conclusion. Pt continues to benefit from therapy services, and will be progressed as tolerated to address goals, reduce pain and improve gait. No signs of distress or adverse reactions observed during, and/ or following tx. Continue with PT's POC.       Timed:         Manual Therapy:   5      mins  74637;     Therapeutic  Exercise:    38     mins  56540;     Neuromuscular Todd:        mins  09022;    Therapeutic Activity:     13     mins  31837;     Gait Training:           mins  56422;     Ultrasound:          mins  56548;    Ionto                                   mins   05543  Self Care                            mins   21046  Canalith Repos         mins 44698      Un-Timed:  Electrical Stimulation:         mins  51249 ( );  Dry Needling          mins self-pay  Traction          mins 71672      Timed Treatment:  56    mins   Total Treatment:    64    mins (CP: x 8min)    Nellie Mejia. JANICE Pro  [place] License: [id number]

## 2022-12-14 ENCOUNTER — TREATMENT (OUTPATIENT)
Dept: PHYSICAL THERAPY | Facility: CLINIC | Age: 62
End: 2022-12-14

## 2022-12-14 DIAGNOSIS — R29.898 DECREASED STRENGTH OF LOWER EXTREMITY: ICD-10-CM

## 2022-12-14 DIAGNOSIS — G89.29 CHRONIC PAIN OF LEFT KNEE: ICD-10-CM

## 2022-12-14 DIAGNOSIS — Z96.652 S/P TOTAL KNEE ARTHROPLASTY, LEFT: Primary | ICD-10-CM

## 2022-12-14 DIAGNOSIS — M25.562 CHRONIC PAIN OF LEFT KNEE: ICD-10-CM

## 2022-12-14 DIAGNOSIS — M25.662 DECREASED RANGE OF MOTION (ROM) OF LEFT KNEE: ICD-10-CM

## 2022-12-14 PROCEDURE — 97110 THERAPEUTIC EXERCISES: CPT | Performed by: PHYSICAL THERAPIST

## 2022-12-14 PROCEDURE — 97530 THERAPEUTIC ACTIVITIES: CPT | Performed by: PHYSICAL THERAPIST

## 2022-12-14 NOTE — PROGRESS NOTES
Physical Therapy Daily Treatment Note      Patient: Bia Mott   : 1960  Referring practitioner: Corina Lagunas PA-C  Date of Initial Visit: Type: THERAPY  Noted: 11/10/2022  Today's Date: 2022  Patient seen for 13 sessions       Visit Diagnoses:    ICD-10-CM ICD-9-CM   1. S/P total knee arthroplasty, left  Z96.652 V43.65   2. Decreased range of motion (ROM) of left knee  M25.662 719.56   3. Decreased strength of lower extremity  R29.898 729.89   4. Chronic pain of left knee  M25.562 719.46    G89.29 338.29       Subjective: Patient reports doing well today with no complaints of left knee pain prior to tx.      Objective   See Exercise, Manual, and Modality Logs for complete treatment.       Assessment/Plan: Patient demonstrated good effort during today's session, and had no complaints of pain increase following. Exercise performed including therex and therapeutic activities as listed to assist with knee ROM deficits, improve LE strength, and to return to PLOF. Exercise progressed with strengthening repetitions increased on total gym. Pt noted w/ good tolerance. Pt was provided with cues during step ups, forward and lateral, for improved quad facilitation and weight shift. Cryotherapy applied at conclusion. Pt will be progressed with therapy as tolerated to address goals, improve strength, and decrease pain. No adverse reactions observed during, and/ or following tx. Continue with PT's POC.       Timed:         Manual Therapy:         mins  85997;     Therapeutic Exercise:   30      mins  51514;     Neuromuscular Todd:        mins  53513;    Therapeutic Activity:      10    mins  76944;     Gait Training:           mins  32313;     Ultrasound:          mins  45077;    Ionto                                   mins   18335  Self Care                            mins   69674  Canalith Repos         mins 69243      Un-Timed:  Electrical Stimulation:         mins  13086 ( );  Dry Needling           mins self-pay  Traction          mins 88896      Timed Treatment:  40    mins   Total Treatment:     48   mins (CP: x 8 min)    Nellie Mejia. Morteza, PTA  [place] License: [id number]

## 2022-12-21 ENCOUNTER — TREATMENT (OUTPATIENT)
Dept: PHYSICAL THERAPY | Facility: CLINIC | Age: 62
End: 2022-12-21

## 2022-12-21 DIAGNOSIS — R29.898 DECREASED STRENGTH OF LOWER EXTREMITY: ICD-10-CM

## 2022-12-21 DIAGNOSIS — M25.662 DECREASED RANGE OF MOTION (ROM) OF LEFT KNEE: ICD-10-CM

## 2022-12-21 DIAGNOSIS — G89.29 CHRONIC PAIN OF LEFT KNEE: ICD-10-CM

## 2022-12-21 DIAGNOSIS — M25.562 CHRONIC PAIN OF LEFT KNEE: ICD-10-CM

## 2022-12-21 DIAGNOSIS — Z96.652 S/P TOTAL KNEE ARTHROPLASTY, LEFT: Primary | ICD-10-CM

## 2022-12-21 PROCEDURE — 97530 THERAPEUTIC ACTIVITIES: CPT | Performed by: PHYSICAL THERAPIST

## 2022-12-21 PROCEDURE — 97110 THERAPEUTIC EXERCISES: CPT | Performed by: PHYSICAL THERAPIST

## 2022-12-21 NOTE — PROGRESS NOTES
Physical Therapy Daily Treatment Note      Patient: Bia Mott   : 1960  Referring practitioner: Corina Lagunas PA-C  Date of Initial Visit: Type: THERAPY  Noted: 11/10/2022  Today's Date: 2022  Patient seen for 14 sessions       Visit Diagnoses:    ICD-10-CM ICD-9-CM   1. S/P total knee arthroplasty, left  Z96.652 V43.65   2. Decreased range of motion (ROM) of left knee  M25.662 719.56   3. Decreased strength of lower extremity  R29.898 729.89   4. Chronic pain of left knee  M25.562 719.46    G89.29 338.29       Subjective Evaluation    History of Present Illness    Subjective comment: Patient notes that she has been tolerating return to work well.Pain  Current pain ratin           Objective   See Exercise, Manual, and Modality Logs for complete treatment.       Assessment & Plan     Assessment    Assessment details: Therapy session initiated with there ex and therapeutic activities, per flow sheet.  Exercises focused on LE strengthening, stabilization, knee ROM, and functional tasks.  Patient was provided with cues during session for proper form and posture.  Exercises progressed to include increased repetitions. Treatment was concluded with cryotherapy, with no skin irritation following.  She will continue to be progressed per her tolerance and POC.          Timed:         Manual Therapy:         mins  78974;     Therapeutic Exercise:    24     mins  86056;     Neuromuscular Todd:        mins  23395;    Therapeutic Activity:     10     mins  72769;     Gait Training:           mins  47523;     Ultrasound:          mins  04683;    Ionto                                   mins   87416  Self Care                            mins   92363  Canalith Repos         mins 08894      Un-Timed:  Electrical Stimulation:         mins  74969 ( );  Dry Needling          mins self-pay  Traction          mins 05418  Ice-8 min    Timed Treatment:   34   mins   Total Treatment:     42   mins    Margaret  NAHEED Frey PT  [place] License: [id number]  Electronically signed by Margaret Frey PT, 12/21/22, 2:22 PM EST.

## 2022-12-22 ENCOUNTER — TELEPHONE (OUTPATIENT)
Dept: ORTHOPEDIC SURGERY | Facility: CLINIC | Age: 62
End: 2022-12-22

## 2022-12-22 NOTE — TELEPHONE ENCOUNTER
Spoke with patient, she asked for restrictions in place until 3/6/23. Alma updated statement to [email or web address]

## 2022-12-22 NOTE — TELEPHONE ENCOUNTER
Caller: Bia Mott    Relationship: Self    Best call back number: [phone]    What form or medical record are you requesting: MEDICAL RELEASE FORM    Who is requesting this form or medical record from you: MAURICIO DAILEY    How would you like to receive the form or medical records (pick-up, mail, fax): EMAIL    @[employer]    Timeframe paperwork needed: ASAP    Additional notes: PREVIOUS MEDICAL RELEASE FORM SENT TO [facility] DID NOT SPECIFIED  A DATE FOR END OF RESTRICTIONS AND PT IS REQUESTING THAT DATE TO BE AT THE END OF February SINCE SHE HAS TO BE ON PHYSICAL THERAPY UNTIL THEN. PT SAID COMPANY DOES NOT HAVE A FAX NUMBER AND PROVIDED E-MAIL ADDRESS TO SEND NEW MEDICAL RELEASE FORM.

## 2022-12-28 ENCOUNTER — TREATMENT (OUTPATIENT)
Dept: PHYSICAL THERAPY | Facility: CLINIC | Age: 62
End: 2022-12-28

## 2022-12-28 DIAGNOSIS — G89.29 CHRONIC PAIN OF LEFT KNEE: ICD-10-CM

## 2022-12-28 DIAGNOSIS — M25.662 DECREASED RANGE OF MOTION (ROM) OF LEFT KNEE: ICD-10-CM

## 2022-12-28 DIAGNOSIS — R29.898 DECREASED STRENGTH OF LOWER EXTREMITY: ICD-10-CM

## 2022-12-28 DIAGNOSIS — M25.562 CHRONIC PAIN OF LEFT KNEE: ICD-10-CM

## 2022-12-28 DIAGNOSIS — Z96.652 S/P TOTAL KNEE ARTHROPLASTY, LEFT: Primary | ICD-10-CM

## 2022-12-28 PROCEDURE — 97112 NEUROMUSCULAR REEDUCATION: CPT | Performed by: PHYSICAL THERAPIST

## 2022-12-28 PROCEDURE — 97110 THERAPEUTIC EXERCISES: CPT | Performed by: PHYSICAL THERAPIST

## 2022-12-28 NOTE — PROGRESS NOTES
Physical Therapy Daily Treatment Note      Patient: Bia Mott   : 1960  Referring practitioner: Corina Lagunas PA-C  Date of Initial Visit: Type: THERAPY  Noted: 11/10/2022  Today's Date: 2022  Patient seen for 15 sessions       Visit Diagnoses:    ICD-10-CM ICD-9-CM   1. S/P total knee arthroplasty, left  Z96.652 V43.65   2. Decreased range of motion (ROM) of left knee  M25.662 719.56   3. Decreased strength of lower extremity  R29.898 729.89   4. Chronic pain of left knee  M25.562 719.46    G89.29 338.29       Subjective: Patient states of no knee pain prior to tx, discomfort only. Pt reports most of her discomfort is along the outside of the knee. Pt feels it is due to standing more at work.       Objective          Active Range of Motion   Left Knee   Flexion: 112 (AAROM) degrees   Extension: Left knee active extension: lacks 2 degrees from full extension.       See Exercise, Manual, and Modality Logs for complete treatment.       Assessment/Plan: Patient responded well to today's session with no reports of knee pain noted following. Pt performed therex and neuro re-ed as listed to assist with improved left knee ROM, improved left LE strength, and closed chain stability. Exercise progressed to include additional balance activities, and resistance of tband increased from red to green with TKE's. Pt demonstrated improved knee flexion AAROM as listed above. Cryotherapy applied at conclusion. Pt continues to benefit from therapy service, and will be progressed as tolerated to address goals, improve strength, and range of motion. No signs of distress or adverse reactions observed during, and/ or following tx. Continue with PT's POC.      Timed:         Manual Therapy:         mins  70717;     Therapeutic Exercise:    36     mins  23561;     Neuromuscular Todd:  10      mins  26363;    Therapeutic Activity:          mins  18786;     Gait Training:           mins  41698;     Ultrasound:           mins  29390;    Ionto                                   mins   86915  Self Care                            mins   57363  Canalith Repos         mins 24333      Un-Timed:  Electrical Stimulation:         mins  51004 ( );  Dry Needling          mins self-pay  Traction          mins 35381      Timed Treatment:  46    mins   Total Treatment:    52    mins (CP: x 6 min)    Nellie Mejia. Morteza, JANICE  [place] License: [id number]

## 2023-01-04 ENCOUNTER — TREATMENT (OUTPATIENT)
Dept: PHYSICAL THERAPY | Facility: CLINIC | Age: 63
End: 2023-01-04
Payer: COMMERCIAL

## 2023-01-04 DIAGNOSIS — G89.29 CHRONIC PAIN OF LEFT KNEE: ICD-10-CM

## 2023-01-04 DIAGNOSIS — M25.662 DECREASED RANGE OF MOTION (ROM) OF LEFT KNEE: ICD-10-CM

## 2023-01-04 DIAGNOSIS — M25.562 CHRONIC PAIN OF LEFT KNEE: ICD-10-CM

## 2023-01-04 DIAGNOSIS — Z96.652 S/P TOTAL KNEE ARTHROPLASTY, LEFT: Primary | ICD-10-CM

## 2023-01-04 DIAGNOSIS — R29.898 DECREASED STRENGTH OF LOWER EXTREMITY: ICD-10-CM

## 2023-01-04 PROCEDURE — 97530 THERAPEUTIC ACTIVITIES: CPT | Performed by: PHYSICAL THERAPIST

## 2023-01-04 PROCEDURE — 97112 NEUROMUSCULAR REEDUCATION: CPT | Performed by: PHYSICAL THERAPIST

## 2023-01-04 PROCEDURE — 97110 THERAPEUTIC EXERCISES: CPT | Performed by: PHYSICAL THERAPIST

## 2023-01-04 NOTE — PROGRESS NOTES
Physical Therapy Re Certification Of Plan of Care  Patient: Bia Mott   : 1960  Diagnosis/ICD-10 Code:  S/P total knee arthroplasty, left [Z96.652]  Referring practitioner: Corina Lagunas PA-C  Date of Initial Visit: Type: THERAPY  Noted: 11/10/2022  Today's Date: 2023  Patient seen for 16 sessions         Visit Diagnoses:    ICD-10-CM ICD-9-CM   1. S/P total knee arthroplasty, left  Z96.652 V43.65   2. Decreased range of motion (ROM) of left knee  M25.662 719.56   3. Decreased strength of lower extremity  R29.898 729.89   4. Chronic pain of left knee  M25.562 719.46    G89.29 338.29         Bia Mott reports:   Subjective Questionnaire: LEFS: 28%  Clinical Progress: improved  Home Program Compliance: Yes  Treatment has included: therapeutic exercise, neuromuscular re-education, manual therapy, therapeutic activity, gait training, ultrasound, moist heat and cryotherapy      Subjective Evaluation    History of Present Illness    Subjective comment: Pt is ready for discharge and will follow up soon for treatment of the right knee.Pain  Current pain ratin  At best pain ratin  At worst pain ratin  Location: left knee with increased work only             Objective          Active Range of Motion   Left Knee   Flexion: 112 degrees   Extension: 0 degrees     Strength/Myotome Testing     Left Knee   Flexion: 4+  Extension: 4+    Ambulation     Comments   Pt amb with no AD with decreased stance on right LE; pt report right knee is worse than left know following her surgery          Assessment & Plan     Assessment    Assessment details: Patient is a 62 year old female who comes to physical therapy for rehabilitation s/p left total knee arthroplasty. Pt has attended 16 sessions with noted improvements with knee mobility, strength and decreased pain. Pt improved her LEFS score from 34% to 28%.  Pt presents with good knee ROM and strength and has requested to cont with her HEP at this  time.  Pt reported no pain following session.  Prognosis: good    Goals  Plan Goals: SHORT TERM GOALS:  4 weeks       1. Patient will be independent/complaint with HEP.met  2. Patient will report pain no greater than 5/10 when performing self-care activities.met  3. Pt to report being able to stand for 10 minutes with pain no greater than 5/10 in the left knee.met  4. Patient will demonstrate 5-0-105 degrees left knee ROM to allow for greater ease with ADLs.met  5. Patient will ascend/descend 10 steps independently with 1 HR and pain no greater than 5/10 to allow for improved independence at home.met    LONG TERM GOALS:   12 weeks  1. Patient will report at least 50/80 on LEFS for improved independence.met  2. Patient to demonstrate left LE strength to 4/5 or greater to improve safety with ambulation on uneven surfaces.met  3. Patient to report being able to walk for 20 minutes without increasing pain in the left knee.met  4. Patient will report no more than 3/10 left knee pain for improved function.not met  5. Patient will demonstrate 0-110 degrees left knee ROM for improved gait. met      Plan  Therapy options: will not be seen for skilled therapy services  Plan details: Pt has met all goals with good gains at this time.  Pt demonstrated a good understanding of her HEP and requested to be discharge.  Pt instructed to contact therapy as needed.             Recommendations: Discharge  Timeframe:   Prognosis to achieve goals: good      Timed:         Manual Therapy:         mins  21339;     Therapeutic Exercise:    33     mins  78997;     Neuromuscular Todd:    14    mins  71960;    Therapeutic Activity:     11     mins  57206;     Gait Training:           mins  12335;     Ultrasound:          mins  57264;    Ionto                                   mins   83121  Self Care                            mins   38411  Canalith Repos         mins 32119      Un-Timed:  Electrical Stimulation:         mins  83067 (  );  Dry Needling          mins self-pay  Traction          mins 41722  Re-Eval                               mins  80483      Timed Treatment:   58   mins   Total Treatment:     58   mins          PT: Rj Cabral PT     [place] License:  [id number]    Electronically signed by Rj Cabral PT, 01/04/23, 10:28 AM EST    Certification Period: 1/4/2023 thru 4/3/2023  I certify that the therapy services are furnished while this patient is under my care.  The services outlined above are required by this patient, and will be reviewed every 90 days.         Physician Signature:__________________________________________________    PHYSICIAN: Corina Lagunas PA-C  NPI: [id number]                                      DATE:  :     Please sign and return via fax to [email or web address] . Thank you, [facility]

## 2023-03-07 ENCOUNTER — OFFICE VISIT (OUTPATIENT)
Dept: ORTHOPEDIC SURGERY | Facility: CLINIC | Age: 63
End: 2023-03-07
Payer: COMMERCIAL

## 2023-03-07 VITALS
HEIGHT: 60 IN | WEIGHT: 147.3 LBS | SYSTOLIC BLOOD PRESSURE: 160 MMHG | DIASTOLIC BLOOD PRESSURE: 72 MMHG | BODY MASS INDEX: 28.92 KG/M2

## 2023-03-07 DIAGNOSIS — Z96.652 S/P TOTAL KNEE ARTHROPLASTY, LEFT: Primary | ICD-10-CM

## 2023-03-07 PROCEDURE — 99213 OFFICE O/P EST LOW 20 MIN: CPT | Performed by: ORTHOPAEDIC SURGERY

## 2023-03-07 NOTE — PROGRESS NOTES
"    [facility] Orthopaedic Surgery Clinic Note        Subjective     CC: Follow-up (3 month follow up -- 6 months S/P TOTAL KNEE ARTHROPLASTY- LEFT DOS:  2022))      IDANIA Mott is a 62 y.o. female.  Patient here today for follow-up of her left press-fit knee replacement on 2022.  She is doing pretty well overall.  Some numbness and tingling associated with her incision.  She has stopped physical therapy.  Says she feels stiffness at work.  Does a lot of standing.    Overall, patient's symptoms are as above.    ROS:    Constiutional:Pt denies fever, chills, nausea, or vomiting.  MSK:as above        Objective      Past Medical History  Past Medical History:   Diagnosis Date   • Anxiety    • Arthritis    • Carpal tunnel syndrome    • Closed displaced comminuted fracture of shaft of right radius 2019    Added automatically from request for surgery    • Depression    • History of chest pain     pt had shingles   • Hypertension    • Radius distal fracture    • Rectal bleeding    • Shingles 2019   • Tendonitis      Social History     Socioeconomic History   • Marital status: Legally    Tobacco Use   • Smoking status: Former     Packs/day: 0.25     Years: 10.00     Pack years: 2.50     Types: Cigarettes, Electronic Cigarette     Quit date: 2015     Years since quittin.1   • Smokeless tobacco: Never   Vaping Use   • Vaping Use: Some days   Substance and Sexual Activity   • Alcohol use: Yes     Comment: socially   • Drug use: No   • Sexual activity: Defer          Physical Exam  /72   Ht 152.4 cm (60\")   Wt 66.8 kg (147 lb 4.8 oz)   BMI 28.77 kg/m²     Body mass index is 28.77 kg/m².    Patient is well nourished and well developed.        Ortho Exam  Range of motion is 0-115.  No effusion.  Incision is healed.    Imaging/Labs/EMG Reviewed:  Imaging Results (Last 24 Hours)     Procedure Component Value Units Date/Time    XR Knee 3+ View With [place] Left [[id number]] " Resulted: 03/07/23 1008     Updated: 03/07/23 1009    Narrative:      Knee X-ray    Indication: status-post TKA    Study:  AP, Lateral, and Sunrise views of Left knee    Comparison: Left knee 12/6/2022    Findings:  No signs of acute fracture are visualized  No signs of loosening are appreciated  Components are well aligned    Impression:  Status post Left total knee arthroplasty. No signs of   loosening or fracture.                Assessment    Assessment:  1. S/P total knee arthroplasty, left        Plan:  1. Recommend over the counter anti-inflammatories for pain and/or swelling  2. Status post press-fit left total knee arthroplasty--patient 6 months out.  She has some stiffness in the plane of flexion and we have shared with her the fact that the more range of motion she gets, the less stress she puts on the implant and likely adds to longevity.  I will see her back in 6 months with an x-ray of her left knee and right knee.  Continue indefinite antibiotic prophylaxis.      Sunny Lal MD  03/07/23  10:20 EST      Dictated Utilizing Dragon Dictation.

## 2023-07-21 ENCOUNTER — TRANSCRIBE ORDERS (OUTPATIENT)
Dept: ADMINISTRATIVE | Facility: HOSPITAL | Age: 63
End: 2023-07-21
Payer: COMMERCIAL

## 2023-07-21 DIAGNOSIS — M81.0 SENILE OSTEOPOROSIS: ICD-10-CM

## 2023-07-21 DIAGNOSIS — Z12.31 VISIT FOR SCREENING MAMMOGRAM: Primary | ICD-10-CM

## 2023-07-26 ENCOUNTER — TRANSCRIBE ORDERS (OUTPATIENT)
Dept: ADMINISTRATIVE | Facility: HOSPITAL | Age: 63
End: 2023-07-26
Payer: COMMERCIAL

## 2023-08-23 ENCOUNTER — HOSPITAL ENCOUNTER (OUTPATIENT)
Dept: BONE DENSITY | Facility: HOSPITAL | Age: 63
Discharge: HOME | End: 2023-08-23
Payer: COMMERCIAL

## 2023-08-23 ENCOUNTER — HOSPITAL ENCOUNTER (OUTPATIENT)
Dept: MAMMOGRAPHY | Facility: HOSPITAL | Age: 63
Discharge: HOME | End: 2023-08-23
Payer: COMMERCIAL

## 2023-08-23 DIAGNOSIS — Z12.31 VISIT FOR SCREENING MAMMOGRAM: ICD-10-CM

## 2023-08-23 DIAGNOSIS — M81.0 SENILE OSTEOPOROSIS: ICD-10-CM

## 2023-08-23 PROCEDURE — 77067 SCR MAMMO BI INCL CAD: CPT

## 2023-08-23 PROCEDURE — 77063 BREAST TOMOSYNTHESIS BI: CPT

## 2023-08-23 PROCEDURE — 77080 DXA BONE DENSITY AXIAL: CPT

## 2023-09-07 ENCOUNTER — OFFICE VISIT (OUTPATIENT)
Dept: ORTHOPEDIC SURGERY | Facility: CLINIC | Age: 63
End: 2023-09-07
Payer: COMMERCIAL

## 2023-09-07 VITALS
HEIGHT: 60 IN | DIASTOLIC BLOOD PRESSURE: 70 MMHG | WEIGHT: 157.6 LBS | BODY MASS INDEX: 30.94 KG/M2 | SYSTOLIC BLOOD PRESSURE: 144 MMHG

## 2023-09-07 DIAGNOSIS — Z96.652 S/P TOTAL KNEE ARTHROPLASTY, LEFT: Primary | ICD-10-CM

## 2023-09-07 DIAGNOSIS — M17.11 PRIMARY OSTEOARTHRITIS OF RIGHT KNEE: ICD-10-CM

## 2023-09-07 RX ORDER — LISINOPRIL AND HYDROCHLOROTHIAZIDE 25; 20 MG/1; MG/1
1 TABLET ORAL DAILY
COMMUNITY
Start: 2023-07-26

## 2023-09-07 NOTE — PROGRESS NOTES
"    [facility] Note        Subjective     CC: Follow-up (6 month follow up -- 1 year S/P TOTAL KNEE ARTHROPLASTY- LEFT DOS: 2022)      IDANIA Mott is a 62 y.o. female.  Patient is here today now 1 year out from left TKA on 2022.  She says she has occasional stiffness but is doing well overall.  She has a new job that is more vigorous than her old job and she says this bothers her.  She says the right knee is better than 1 would expect given the severity of arthritis there.    Overall, patient's symptoms are as above.    ROS:    Constiutional:Pt denies fever, chills, nausea, or vomiting.  MSK:as above        Objective      Past Medical History  Past Medical History:   Diagnosis Date    Anxiety     Arthritis     Carpal tunnel syndrome     Closed displaced comminuted fracture of shaft of right radius 2019    Added automatically from request for surgery     Depression     History of chest pain     pt had shingles    Hypertension     Radius distal fracture     Rectal bleeding     Shingles 2019    Tendonitis      Social History     Socioeconomic History    Marital status: Legally    Tobacco Use    Smoking status: Former     Packs/day: 0.25     Years: 10.00     Pack years: 2.50     Types: Cigarettes, Electronic Cigarette     Quit date: 2015     Years since quittin.6    Smokeless tobacco: Never   Vaping Use    Vaping Use: Some days   Substance and Sexual Activity    Alcohol use: Yes     Comment: socially    Drug use: No    Sexual activity: Defer          Physical Exam  /70   Ht 151.8 cm (59.75\")   Wt 71.5 kg (157 lb 9.6 oz)   BMI 31.04 kg/m²     Body mass index is 31.04 kg/m².    Patient is well nourished and well developed.        Ortho Exam      Musculoskeletal:  Global Assessment:  Overall assessment of Lower Extremity Muscle Strength and Tone:  Right quadriceps--5/5   Right hamstrings--5/5       Right tibialis anterior--5/5  Right " gastroc-soleus--5/5  Right EHL --5/5    Lower Extremity:    Inspection and Palpation:  Right knee:  Tenderness:  Over the medial joint line and moderate severity  Effusion:  1+  Crepitus:  Positive  Pulses:  2+  Ecchymosis:  None  Warmth:  None     ROM:  Right:  Extension: 5    Flexion:120    Instability:    Right:  Lachman Test:  Negative   Varus stress test negative   Valgus stress test negative    Deformities/Malalignments/Discrepancies:    Left:  No deformities   Right:  Genu Varum    Functional Testing:  Maribell's test:  Negative  Patella grind test:  Positive  Q-angle:  normal        Lower Extremity:     Inspection and Palpation:      Left knee:  Calf:  Soft and non tender  Effusion:  None  Pulses:  2+  Warmth:  None  Incision:  Healed     ROM:  Left:  Extension:0    Flexion:120      Deformities/Malalignments/Discrepancies:    Left:  none    Functional Testing:  Left:  Straight Leg Raise: 5/5  Patella Tracking:  Normal          Imaging/Labs/EMG Reviewed:  Imaging Results (Last 24 Hours)       Procedure Component Value Units Date/Time    XR Knee 4+ View Right [[id number]] Resulted: 09/07/23 1008     Updated: 09/07/23 1009    Narrative:      Knee X-Ray    Indication: Pain    Study:  Upright AP, Skiers, Lateral, and Sunrise views of Right knee(s)    Comparison: None    Findings:    Patient appears to have severe hypertrophic degenerative changes in the   medial compartment.    There are moderate degenerative changes in the lateral compartment.    There are severe changes in the patellofemoral compartment.    Patient has overall varus alignment.    Kellgren-Steven thGthrthathdtheth:th th5th Impression:   Severe hypertrophic medial compartment and patellofemoral compartment   degnerative changes of the knee       XR Knee 3+ View With Cactus Left [[id number]] Resulted: 09/07/23 1007     Updated: 09/07/23 1008    Narrative:      Knee X-ray    Indication: status-post TKA    Study:  AP, Lateral, and Sunrise views of Left  knee    Comparison: Left knee 3/7/2023    Findings:  No signs of acute fracture are visualized  No signs of loosening are appreciated  Components are well aligned    Impression:  Status post Left total knee arthroplasty. No signs of   loosening or fracture.                Assessment    Assessment:  1. S/P total knee arthroplasty, left    2. Primary osteoarthritis of right knee        Plan:  Recommend over the counter anti-inflammatories for pain and/or swelling  Status post left TKA--press-fit implant.  Good incorporation.  Follow-up in year with radiographs or sooner if necessary.  Continue indefinite antibiotic prophylaxis  Severe right knee arthritis--patient would like to wait on any treatment for now.  She is tentatively planning on arthroplasty a year from now.  I will see her back in a year with x-rays.      Sunny Lal MD  09/07/23  10:32 EDT      Dictated Utilizing Dragon Dictation.

## 2024-06-22 ENCOUNTER — HOSPITAL ENCOUNTER (EMERGENCY)
Facility: HOSPITAL | Age: 64
Discharge: HOME | End: 2024-06-22
Attending: STUDENT IN AN ORGANIZED HEALTH CARE EDUCATION/TRAINING PROGRAM
Payer: COMMERCIAL

## 2024-06-22 VITALS
BODY MASS INDEX: 34.55 KG/M2 | WEIGHT: 176 LBS | OXYGEN SATURATION: 98 % | RESPIRATION RATE: 18 BRPM | TEMPERATURE: 98.6 F | DIASTOLIC BLOOD PRESSURE: 91 MMHG | HEART RATE: 104 BPM | SYSTOLIC BLOOD PRESSURE: 169 MMHG | HEIGHT: 60 IN

## 2024-06-22 DIAGNOSIS — R04.0 NOSEBLEED: Primary | ICD-10-CM

## 2024-06-22 PROCEDURE — 99282 EMERGENCY DEPT VISIT SF MDM: CPT

## 2024-06-22 RX ORDER — OXYMETAZOLINE HYDROCHLORIDE 0.05 G/100ML
1 SPRAY NASAL ONCE
Status: COMPLETED | OUTPATIENT
Start: 2024-06-22 | End: 2024-06-22

## 2024-06-22 RX ADMIN — Medication 1 SPRAY: at 12:36

## 2024-06-22 NOTE — ED PROVIDER NOTES
Subjective   History of Present Illness  Patient is a 63-year-old female who comes to the ER with nosebleed.  She states she has had several this week and was seen at an outpatient urgent care on Tuesday.  No started bleeding earlier today out of the left nare, then started bleeding on the right nare.  She does not take blood thinners.  Denies any other complaints.      Review of Systems   Constitutional:  Negative for chills, fatigue and fever.   HENT:  Positive for nosebleeds. Negative for ear pain, sinus pain and sore throat.    Respiratory:  Negative for cough, chest tightness, shortness of breath and wheezing.    Cardiovascular:  Negative for chest pain, palpitations and leg swelling.   Gastrointestinal:  Negative for abdominal pain, constipation, diarrhea, nausea and vomiting.   Genitourinary:  Negative for dysuria, hematuria and urgency.   Musculoskeletal:  Negative for arthralgias and myalgias.   Neurological:  Negative for dizziness, syncope and light-headedness.   Psychiatric/Behavioral:  Negative for confusion.        Past Medical History:   Diagnosis Date    Anxiety     Arthritis     Carpal tunnel syndrome     Closed displaced comminuted fracture of shaft of right radius 01/21/2019    Added automatically from request for surgery [id number]    Depression     History of chest pain 2019    pt had shingles    Hypertension     Radius distal fracture     Rectal bleeding     Shingles 2019    Tendonitis        Allergies   Allergen Reactions    Lodine [Etodolac] Delirium       Past Surgical History:   Procedure Laterality Date    COLONOSCOPY  2011    COLONOSCOPY N/A 09/06/2016    Procedure: COLONOSCOPY  CPTCODE: 81607;  Surgeon: Daniel Boone III, MD;  Location: [facility];  Service:     FOOT SURGERY Right     HYSTERECTOMY      42, complete    JOINT REPLACEMENT      KNEE SURGERY Left     arthroscopy    ORIF ULNA/RADIUS FRACTURES Right 01/22/2019    Procedure: OPEN REDUCTION INTERNAL FIXATION DISTAL RADIUS;   Surgeon: Cristo Villeda MD;  Location: [facility] OR;  Service: Orthopedics    TOTAL KNEE ARTHROPLASTY Left 2022    Procedure: TOTAL KNEE ARTHROPLASTY- LEFT;  Surgeon: Sunny Lal MD;  Location: [facility] OR;  Service: Orthopedics;  Laterality: Left;    WRIST SURGERY         Family History   Problem Relation Age of Onset    Arthritis Mother     Hypertension Mother     Heart disease Father     Diabetes Father     Hypertension Father     Hypertension Sister     Heart disease Brother     Hypertension Brother     Breast cancer Neg Hx        Social History     Socioeconomic History    Marital status: Legally    Tobacco Use    Smoking status: Former     Current packs/day: 0.00     Average packs/day: 0.3 packs/day for 10.0 years (2.5 ttl pk-yrs)     Types: Cigarettes, Electronic Cigarette     Start date: 2005     Quit date: 2015     Years since quittin.4    Smokeless tobacco: Never   Vaping Use    Vaping status: Some Days   Substance and Sexual Activity    Alcohol use: Yes     Comment: socially    Drug use: No    Sexual activity: Defer           Objective   Physical Exam  Vitals and nursing note reviewed. Exam conducted with a chaperone present.   Constitutional:       Appearance: Normal appearance. She is normal weight.   HENT:      Head: Normocephalic and atraumatic.      Nose:      Comments: Active nosebleed, left nare     Mouth/Throat:      Mouth: Mucous membranes are moist. Mucous membranes are dry.   Eyes:      Extraocular Movements: Extraocular movements intact.      Conjunctiva/sclera: Conjunctivae normal.      Pupils: Pupils are equal, round, and reactive to light.   Cardiovascular:      Rate and Rhythm: Regular rhythm. Tachycardia present.      Pulses: Normal pulses.      Heart sounds: Normal heart sounds.   Pulmonary:      Effort: Pulmonary effort is normal.      Breath sounds: Normal breath sounds.   Abdominal:      General: Bowel sounds are normal.      Palpations:  Abdomen is soft.   Musculoskeletal:         General: Normal range of motion.      Cervical back: Normal range of motion and neck supple.   Skin:     General: Skin is warm and dry.      Capillary Refill: Capillary refill takes less than 2 seconds.   Neurological:      General: No focal deficit present.      Mental Status: She is alert and oriented to person, place, and time.   Psychiatric:         Mood and Affect: Mood normal.         Behavior: Behavior normal.         Epistaxis Management    Date/Time: 6/22/2024 1:45 PM    Performed by: Hayden Hoffman DO  Authorized by: Hayden Hoffman DO    Consent:     Consent obtained:  Verbal    Consent given by:  Patient    Risks, benefits, and alternatives were discussed: yes      Risks discussed:  Bleeding, infection, nasal injury and pain    Alternatives discussed:  Delayed treatment, alternative treatment, observation, referral and no treatment  Universal protocol:     Procedure explained and questions answered to patient or proxy's satisfaction: yes      Relevant documents present and verified: yes      Test results available: yes      Imaging studies available: yes      Required blood products, implants, devices, and special equipment available: yes      Site/side marked: yes      Immediately prior to procedure, a time out was called: yes      Patient identity confirmed:  Verbally with patient and arm band  Anesthesia:     Anesthesia method:  None  Procedure details:     Treatment site:  L posterior    Treatment method:  Nasal balloon    Treatment complexity:  Limited    Treatment episode: initial    Post-procedure details:     Assessment:  Bleeding stopped    Procedure completion:  Tolerated well, no immediate complications             ED Course                                             Medical Decision Making  -- Patient is hemodynamically stable, Afrin and Rhino Rocket soaked in TXA after initial treatment failed, Rhino Rocket placed, follow-up  outpatient    Amount and/or Complexity of Data Reviewed  Labs: ordered.    Risk  OTC drugs.  Prescription drug management.        Final diagnoses:   Nosebleed       ED Disposition  ED Disposition       ED Disposition   Discharge    Condition   Stable    Comment   --               Rubia Brandon MD  [address]  [facility] [address] [phone]    In 1 week           Medication List      No changes were made to your prescriptions during this visit.            Hayden Hoffman DO  06/22/24 [id number]

## 2024-10-21 ENCOUNTER — TRANSCRIBE ORDERS (OUTPATIENT)
Dept: ADMINISTRATIVE | Facility: HOSPITAL | Age: 64
End: 2024-10-21
Payer: COMMERCIAL

## 2024-10-21 DIAGNOSIS — Z12.31 VISIT FOR SCREENING MAMMOGRAM: Primary | ICD-10-CM

## 2024-10-21 DIAGNOSIS — R01.1 HEART MURMUR: ICD-10-CM

## 2024-12-04 ENCOUNTER — LAB (OUTPATIENT)
Dept: LAB | Facility: HOSPITAL | Age: 64
End: 2024-12-04
Payer: COMMERCIAL

## 2024-12-04 ENCOUNTER — OFFICE VISIT (OUTPATIENT)
Dept: SURGERY | Facility: CLINIC | Age: 64
End: 2024-12-04
Payer: COMMERCIAL

## 2024-12-04 VITALS — BODY MASS INDEX: 35.38 KG/M2 | HEIGHT: 60 IN | WEIGHT: 180.2 LBS

## 2024-12-04 DIAGNOSIS — R10.13 EPIGASTRIC PAIN: ICD-10-CM

## 2024-12-04 DIAGNOSIS — R10.13 EPIGASTRIC PAIN: Primary | ICD-10-CM

## 2024-12-04 LAB — H PYLORI IGG SER IA-ACNC: NEGATIVE

## 2024-12-04 PROCEDURE — 1160F RVW MEDS BY RX/DR IN RCRD: CPT | Performed by: SURGERY

## 2024-12-04 PROCEDURE — 99243 OFF/OP CNSLTJ NEW/EST LOW 30: CPT | Performed by: SURGERY

## 2024-12-04 PROCEDURE — 1159F MED LIST DOCD IN RCRD: CPT | Performed by: SURGERY

## 2024-12-04 PROCEDURE — 36415 COLL VENOUS BLD VENIPUNCTURE: CPT

## 2024-12-04 PROCEDURE — 86677 HELICOBACTER PYLORI ANTIBODY: CPT

## 2024-12-04 RX ORDER — ASPIRIN 81 MG/1
81 TABLET ORAL DAILY
COMMUNITY

## 2024-12-04 RX ORDER — PANTOPRAZOLE SODIUM 40 MG/1
40 TABLET, DELAYED RELEASE ORAL 2 TIMES DAILY WITH MEALS
Qty: 60 TABLET | Refills: 1 | Status: SHIPPED | OUTPATIENT
Start: 2024-12-04

## 2024-12-04 RX ORDER — PANTOPRAZOLE SODIUM 40 MG/1
TABLET, DELAYED RELEASE ORAL
COMMUNITY
Start: 2024-10-31

## 2024-12-04 NOTE — PROGRESS NOTES
Subjective   Bia Mott is a 64 y.o. female is being seen for consultation today at the request of Rubia Brandon MD    Bia Mott is a 64 y.o. female with GERD and belching.  Significant bloating and belching with epigastric pain was noted and she was started on once daily PPI therapy.  This improved for some time but she has now developed worsening symptoms that have responded to twice daily Protonix.  Her symptoms have resolved.  She is a former smoker who currently vapes.  No unintentional weight loss.  No hematemesis or melena.  She has had H. pylori in the past.    History of Present Illness      Past Medical History:   Diagnosis Date    Anxiety     Arthritis     Carpal tunnel syndrome     Closed displaced comminuted fracture of shaft of right radius 2019    Added automatically from request for surgery     Depression     History of chest pain     pt had shingles    Hypertension     Radius distal fracture     Rectal bleeding     Shingles 2019    Tendonitis        Family History   Problem Relation Age of Onset    Arthritis Mother     Hypertension Mother     Heart disease Father     Diabetes Father     Hypertension Father     Hypertension Sister     Heart disease Brother     Hypertension Brother     Breast cancer Neg Hx        Social History     Socioeconomic History    Marital status: Legally    Tobacco Use    Smoking status: Former     Current packs/day: 0.00     Average packs/day: 0.3 packs/day for 10.0 years (2.5 ttl pk-yrs)     Types: Cigarettes, Electronic Cigarette     Start date: 2005     Quit date: 2015     Years since quittin.9    Smokeless tobacco: Never   Vaping Use    Vaping status: Some Days   Substance and Sexual Activity    Alcohol use: Yes     Comment: socially    Drug use: No    Sexual activity: Defer       Past Surgical History:   Procedure Laterality Date    COLONOSCOPY      COLONOSCOPY N/A 2016    Procedure: COLONOSCOPY  CPTCODE:  "58036;  Surgeon: Daniel Boone III, MD;  Location:  [facility] OR;  Service:     FOOT SURGERY Right     HYSTERECTOMY      42, complete    JOINT REPLACEMENT      KNEE SURGERY Left     arthroscopy    ORIF ULNA/RADIUS FRACTURES Right 01/22/2019    Procedure: OPEN REDUCTION INTERNAL FIXATION DISTAL RADIUS;  Surgeon: Cristo Villeda MD;  Location:  [facility] OR;  Service: Orthopedics    TOTAL KNEE ARTHROPLASTY Left 09/07/2022    Procedure: TOTAL KNEE ARTHROPLASTY- LEFT;  Surgeon: Sunny Lal MD;  Location:  [facility] OR;  Service: Orthopedics;  Laterality: Left;    WRIST SURGERY         Review of Systems   Constitutional:  Negative for activity change, appetite change, chills and fever.   HENT:  Negative for sore throat and trouble swallowing.    Eyes:  Negative for visual disturbance.   Respiratory:  Negative for cough and shortness of breath.    Cardiovascular:  Negative for chest pain and palpitations.   Gastrointestinal:  Negative for abdominal distention, abdominal pain, blood in stool, constipation, diarrhea, nausea and vomiting.   Endocrine: Negative for cold intolerance and heat intolerance.   Genitourinary:  Negative for dysuria.   Musculoskeletal:  Negative for joint swelling.   Skin:  Negative for color change, rash and wound.   Allergic/Immunologic: Negative for immunocompromised state.   Neurological:  Negative for dizziness, seizures, weakness and headaches.   Hematological:  Negative for adenopathy. Does not bruise/bleed easily.   Psychiatric/Behavioral:  Negative for agitation and confusion.        Results        Ht 152.4 cm (60\")   Wt 81.7 kg (180 lb 3.2 oz)   BMI 35.19 kg/m²   Objective   Physical Exam  Constitutional:       Appearance: She is well-developed.   HENT:      Head: Normocephalic and atraumatic.   Eyes:      Conjunctiva/sclera: Conjunctivae normal.      Pupils: Pupils are equal, round, and reactive to light.   Neck:      Thyroid: No thyromegaly.      Vascular: No JVD.      " Trachea: No tracheal deviation.   Cardiovascular:      Rate and Rhythm: Normal rate and regular rhythm.      Heart sounds: No murmur heard.     No friction rub. No gallop.   Pulmonary:      Effort: Pulmonary effort is normal.      Breath sounds: Normal breath sounds.   Abdominal:      General: There is no distension.      Palpations: Abdomen is soft. There is no hepatomegaly or splenomegaly.      Tenderness: There is no abdominal tenderness.      Hernia: No hernia is present.   Musculoskeletal:         General: No deformity. Normal range of motion.      Cervical back: Neck supple.   Skin:     General: Skin is warm and dry.   Neurological:      Mental Status: She is alert and oriented to person, place, and time.       Physical Exam      Assessment & Plan            Assessment   Diagnoses and all orders for this visit:    1. Epigastric pain (Primary)  -     H. Pylori Breath Test - Breath, Lung; Future  -     FL ESOPHAGRAM SINGLE CONTRAST    Other orders  -     pantoprazole (Protonix) 40 MG EC tablet; Take 1 tablet by mouth 2 (Two) Times a Day With Meals.  Dispense: 60 tablet; Refill: 1        Assessment & Plan      Bia Mott is a 64 y.o. female with epigastric discomfort improving with twice daily PPI therapy.  Will refill prescription for twice daily PPI and she will undergo esophagram to evaluate for hiatal hernia, she will also undergo H. pylori testing.  Follow-up in 1 to 2 weeks.    Class 2 Severe Obesity (BMI >=35 and <=39.9). Obesity-related health conditions include the following: hypertension. Obesity is unchanged. BMI is is above average; BMI management plan is completed. We discussed portion control and increasing exercise.            This document has been electronically signed by Troy Melchor MD   December 4, 2024 10:20 EST

## 2024-12-27 ENCOUNTER — TELEPHONE (OUTPATIENT)
Dept: SURGERY | Facility: CLINIC | Age: 64
End: 2024-12-27
Payer: COMMERCIAL

## 2024-12-27 NOTE — TELEPHONE ENCOUNTER
Tried contacting patient about scheduling her radiology. Unable to leave message. Will send a letter to patient.

## 2025-01-14 NOTE — PROGRESS NOTES
[facility] Note        Subjective     Pain of the Left Knee and Pain of the Right Knee      HPI     Bia Mott is a 61 y.o. female who presents with new problem of: bilateral knee pain.  Onset: atraumatic and gradual in nature. The issue has been ongoing for 20 year(s). Pain is a 10/10 on the pain scale. Pain is described as aching, throbbing and shooting. Associated symptoms include swelling, popping, stiffness and giving way/buckling. The pain is worse with walking, standing and climbing stairs; resting and pain medication and/or NSAID improve the pain. Previous treatments have included: steroid injection (last injection 04.18.2022)bracing, weight loss.    I have reviewed the following portions of the patient's history:History of Present Illness and review of systems.      Patient is here today for evaluation of left greater than right knee pain.  This has been going on for the last 20+ years.  She had arthroscopic surgery in December 2008 on the left knee.  This was done in [place].  Patient had a steroid injection in April 2022 with minimal help.  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  She has lost 30 to 40 pounds in the last year or so.  She lives with her son in a two-story apartment.      Past Medical History:   Diagnosis Date   • Anxiety    • Arthritis    • Carpal tunnel syndrome    • Closed displaced comminuted fracture of shaft of right radius 1/21/2019    Added automatically from request for surgery [id number]   • Depression    • Hypertension    • Radius distal fracture    • Rectal bleeding    • Tendonitis       Past Surgical History:   Procedure Laterality Date   • COLONOSCOPY  2011   • COLONOSCOPY N/A 9/6/2016    Procedure: COLONOSCOPY  CPTCODE: 44888;  Surgeon: Daniel Boone III, MD;  Location: [facility];  Service:    • FOOT SURGERY     • HYSTERECTOMY      43   • KNEE SURGERY     • ORIF ULNA/RADIUS FRACTURES Right 1/22/2019     -Reporting some improvement in anxiety symptoms since titrating Lexapro.  Does not take REYES-7 screener.      Continue Lexapro to 15 mg daily with goal of improving depression/anxiety symptoms. Monitor for adjustment.  Recommend continue therapy. Patient seeing therapist at [facility], though mom reporting multiple rescheduled appointments and now therapist is away on vacation, looking for alternative therapist.   Agree with IEP supports in school. Would likely benefit from OT, social skills groups.  Orders:    escitalopram (LEXAPRO) 5 mg tablet; Take 3 tablets (15 mg total) by mouth daily     Procedure: OPEN REDUCTION INTERNAL FIXATION DISTAL RADIUS;  Surgeon: Cristo Villeda MD;  Location: [facility];  Service: Orthopedics      Family History   Problem Relation Age of Onset   • Arthritis Mother    • Hypertension Mother    • Heart disease Father    • Diabetes Father    • Hypertension Father    • Hypertension Sister    • Heart disease Brother    • Hypertension Brother      Social History     Socioeconomic History   • Marital status: Legally    Tobacco Use   • Smoking status: Former Smoker     Packs/day: 0.25     Years: 10.00     Pack years: 2.50     Types: Cigarettes     Quit date:      Years since quittin.5   • Smokeless tobacco: Never Used   Vaping Use   • Vaping Use: Some days   Substance and Sexual Activity   • Alcohol use: Yes     Comment: socially   • Drug use: No   • Sexual activity: Defer      Current Outpatient Medications on File Prior to Visit   Medication Sig Dispense Refill   • ALLER-EASE 180 MG tablet daily.     • aspirin 81 MG EC tablet Take 81 mg by mouth Daily.     • indomethacin SR (INDOCIN SR) 75 MG CR capsule Take 75 mg by mouth Daily.     • lansoprazole (PREVACID) 30 MG capsule      • lidocaine (LIDODERM) 5 % APPLY 2 PATCH(ES) TO MOST PAINFUL AREA.MAY REMAIN IN PLACE FOR UP TO 12 HOURS. THEN REMOVE PATCH(ES). (12 HOUR PATCH FREE PERIOD).     • lisinopril-hydrochlorothiazide (PRINZIDE,ZESTORETIC) 20-12.5 MG per tablet Take 1 tablet by mouth Daily. As needed     • Omeprazole Magnesium (PRILOSEC PO) Take 30 mg by mouth.     • vitamin D (ERGOCALCIFEROL) 32572 UNITS capsule capsule 1 (One) Time Per Week. As needed     • montelukast (SINGULAIR) 10 MG tablet daily.       No current facility-administered medications on file prior to visit.      Allergies   Allergen Reactions   • Lodine [Etodolac] Delirium          Review of Systems   Constitutional: Positive for unexpected weight change.   HENT: Negative.    Eyes: Negative.    Respiratory: Negative.    Cardiovascular:  "Negative.    Gastrointestinal: Negative.    Endocrine: Negative.    Genitourinary: Negative.    Musculoskeletal: Positive for arthralgias and joint swelling.   Skin: Negative.    Allergic/Immunologic: Negative.    Neurological: Negative.    Hematological: Negative.    Psychiatric/Behavioral: Negative.         I reviewed the patient's chief complaint, history of present illness, review of systems, past medical history, surgical history, family history, social history, medications and allergy list.        Objective      Physical Exam  /82   Ht 152.4 cm (60\")   Wt 68.4 kg (150 lb 12.8 oz)   BMI 29.45 kg/m²     Body mass index is 29.45 kg/m².    General  Mental Status - alert  General Appearance - cooperative, well groomed, not in acute distress  Orientation - Oriented X3  Build & Nutrition - well developed and well nourished  Posture - normal posture  Gait - normal gait       Ortho Exam      Musculoskeletal:  Global Assessment:  Overall assessment of Lower Extremity Muscle Strength and Tone:  Left quadriceps--5/5   Left hamstrings--5/5       Left tibialis anterior--5/5  Left gastroc-soleus--5/5  Left EHL --5/5    Lower Extremity:    Inspection and Palpation:  Left knee:  Tenderness:  Over the medial joint line and moderate severity  Effusion:  1+  Crepitus:  Positive  Pulses:  2+  Ecchymosis:  None  Warmth:  None     ROM:  Left:  Extension: 5     Flexion:120    Instability:    Left:    Lachman Test:  Negative   Varus stress test negative  Valgus stress test negative    Deformities/Malalignments/Discrepancies:    Left:  Genu Varum     Functional Testing:  Maribell's test:  Negative  Patella grind test:  Positive  Q-angle:  Normal          Musculoskeletal:  Global Assessment:  Overall assessment of Lower Extremity Muscle Strength and Tone:  Right quadriceps--5/5   Right hamstrings--5/5       Right tibialis anterior--5/5  Right gastroc-soleus--5/5  Right EHL --5/5    Lower Extremity:    Inspection and Palpation:  " Right knee:  Tenderness:  Over the medial joint line and moderate severity  Effusion:  1+  Crepitus:  Positive  Pulses:  2+  Ecchymosis:  None  Warmth:  None     ROM:  Right:  Extension: 5    Flexion:120    Instability:    Right:  Lachman Test:  Negative   Varus stress test negative   Valgus stress test negative    Deformities/Malalignments/Discrepancies:    Left:  No deformities   Right:  Genu Varum    Functional Testing:  Maribell's test:  Negative  Patella grind test:  Positive  Q-angle:  normal            Imaging/Studies  Imaging Results (Last 24 Hours)     ** No results found for the last 24 hours. **        We have reviewed radiographs of the patient's left knee in the office today.  Patient has severe medial and patellofemoral compartment disease.  Disease is hypertrophic.  X-rays of the right knee from September 2020 studies revealed severe degenerative changes as well.    Assessment    Assessment:  1. Primary osteoarthritis of knees, bilateral    2. Primary osteoarthritis of left knee        Plan:  1. Continue over-the-counter medication as needed for discomfort  2. Right knee arthritis--observe for now.  Symptoms are under control according to the patient.  Radiographs reveal severe disease  3. Left knee arthritis--end-stage medially and in the patellofemoral compartment.  We discussed the risk, benefits, potential hazards, typical recovery and rehab as well as reasonable alternatives to left total knee arthroplasty.  Patient had the opportunity to ask questions and wishes to proceed with scheduling.  We will get this done as an outpatient in the near future.  I will see her back preoperatively with her son answer any final questions.  We will use home health physical therapy for 3 weeks and then transition outpatient therapy in the [place] area.  We will use a full dose aspirin daily for DVT prophylaxis.  She will need to come off her Indocin prior to surgery.        Sunny Lal,  MD  07/21/22  12:48 EDT      Dictated Utilizing Dragon Dictation.

## 2025-04-25 ENCOUNTER — HOSPITAL ENCOUNTER (OUTPATIENT)
Dept: GENERAL RADIOLOGY | Facility: HOSPITAL | Age: 65
Discharge: HOME | End: 2025-04-25
Payer: COMMERCIAL

## 2025-04-25 ENCOUNTER — HOSPITAL ENCOUNTER (OUTPATIENT)
Dept: CARDIOLOGY | Facility: HOSPITAL | Age: 65
Discharge: HOME | End: 2025-04-25
Payer: COMMERCIAL

## 2025-04-25 ENCOUNTER — HOSPITAL ENCOUNTER (OUTPATIENT)
Dept: MAMMOGRAPHY | Facility: HOSPITAL | Age: 65
Discharge: HOME | End: 2025-04-25
Admitting: INTERNAL MEDICINE
Payer: COMMERCIAL

## 2025-04-25 DIAGNOSIS — R01.1 HEART MURMUR: ICD-10-CM

## 2025-04-25 DIAGNOSIS — Z12.31 VISIT FOR SCREENING MAMMOGRAM: ICD-10-CM

## 2025-04-25 DIAGNOSIS — R10.13 EPIGASTRIC PAIN: Primary | ICD-10-CM

## 2025-04-25 PROCEDURE — 77067 SCR MAMMO BI INCL CAD: CPT

## 2025-04-25 PROCEDURE — 93306 TTE W/DOPPLER COMPLETE: CPT

## 2025-04-25 PROCEDURE — 74220 X-RAY XM ESOPHAGUS 1CNTRST: CPT | Performed by: RADIOLOGY

## 2025-04-25 PROCEDURE — 77063 BREAST TOMOSYNTHESIS BI: CPT

## 2025-04-25 PROCEDURE — 74220 X-RAY XM ESOPHAGUS 1CNTRST: CPT

## 2025-04-25 PROCEDURE — 77067 SCR MAMMO BI INCL CAD: CPT | Performed by: RADIOLOGY

## 2025-04-25 PROCEDURE — 77063 BREAST TOMOSYNTHESIS BI: CPT | Performed by: RADIOLOGY

## 2025-04-25 RX ORDER — PANTOPRAZOLE SODIUM 40 MG/1
40 TABLET, DELAYED RELEASE ORAL 2 TIMES DAILY
Qty: 30 TABLET | Refills: 1 | Status: SHIPPED | OUTPATIENT
Start: 2025-04-25 | End: 2026-04-25

## 2025-04-26 LAB
AORTIC DIMENSIONLESS INDEX: 0.43 (DI)
AV MEAN PRESS GRAD SYS DOP V1V2: 32.4 MMHG
AV VMAX SYS DOP: 387.8 CM/SEC
BH CV ECHO MEAS - ACS: 2 CM
BH CV ECHO MEAS - AO MAX PG: 60.3 MMHG
BH CV ECHO MEAS - AO ROOT DIAM: 3.3 CM
BH CV ECHO MEAS - AO V2 VTI: 79.5 CM
BH CV ECHO MEAS - AVA(I,D): 1.08 CM2
BH CV ECHO MEAS - EDV(CUBED): 114.1 ML
BH CV ECHO MEAS - EDV(MOD-SP2): 144 ML
BH CV ECHO MEAS - EDV(MOD-SP4): 182 ML
BH CV ECHO MEAS - EF(MOD-SP2): 63.8 %
BH CV ECHO MEAS - EF(MOD-SP4): 62.4 %
BH CV ECHO MEAS - ESV(CUBED): 36.6 ML
BH CV ECHO MEAS - ESV(MOD-SP2): 52.1 ML
BH CV ECHO MEAS - ESV(MOD-SP4): 68.5 ML
BH CV ECHO MEAS - FS: 31.5 %
BH CV ECHO MEAS - IVS/LVPW: 1.29 CM
BH CV ECHO MEAS - IVSD: 1.27 CM
BH CV ECHO MEAS - LA DIMENSION: 4.1 CM
BH CV ECHO MEAS - LAT PEAK E' VEL: 8.9 CM/SEC
BH CV ECHO MEAS - LV DIASTOLIC VOL/BSA (35-75): 102 CM2
BH CV ECHO MEAS - LV MASS(C)D: 203.6 GRAMS
BH CV ECHO MEAS - LV MAX PG: 10 MMHG
BH CV ECHO MEAS - LV MEAN PG: 5 MMHG
BH CV ECHO MEAS - LV SYSTOLIC VOL/BSA (12-30): 38.4 CM2
BH CV ECHO MEAS - LV V1 MAX: 158 CM/SEC
BH CV ECHO MEAS - LV V1 VTI: 33.9 CM
BH CV ECHO MEAS - LVIDD: 4.9 CM
BH CV ECHO MEAS - LVIDS: 3.3 CM
BH CV ECHO MEAS - LVOT AREA: 2.5 CM2
BH CV ECHO MEAS - LVOT DIAM: 1.8 CM
BH CV ECHO MEAS - LVPWD: 0.98 CM
BH CV ECHO MEAS - MED PEAK E' VEL: 6.5 CM/SEC
BH CV ECHO MEAS - MR MAX PG: 182.8 MMHG
BH CV ECHO MEAS - MR MAX VEL: 676 CM/SEC
BH CV ECHO MEAS - MR MEAN PG: 107 MMHG
BH CV ECHO MEAS - MR MEAN VEL: 480 CM/SEC
BH CV ECHO MEAS - MR VTI: 165 CM
BH CV ECHO MEAS - MV A MAX VEL: 111 CM/SEC
BH CV ECHO MEAS - MV DEC SLOPE: 540 CM/SEC2
BH CV ECHO MEAS - MV DEC TIME: 0.25 SEC
BH CV ECHO MEAS - MV E MAX VEL: 135 CM/SEC
BH CV ECHO MEAS - MV E/A: 1.22
BH CV ECHO MEAS - MV MAX PG: 11.3 MMHG
BH CV ECHO MEAS - MV MEAN PG: 6 MMHG
BH CV ECHO MEAS - MV V2 VTI: 34.3 CM
BH CV ECHO MEAS - MVA(VTI): 2.5 CM2
BH CV ECHO MEAS - PA ACC TIME: 0.09 SEC
BH CV ECHO MEAS - PA V2 MAX: 125 CM/SEC
BH CV ECHO MEAS - RAP SYSTOLE: 10 MMHG
BH CV ECHO MEAS - RVSP: 39.4 MMHG
BH CV ECHO MEAS - SV(LVOT): 86.3 ML
BH CV ECHO MEAS - SV(MOD-SP2): 91.9 ML
BH CV ECHO MEAS - SV(MOD-SP4): 113.5 ML
BH CV ECHO MEAS - SVI(LVOT): 48.3 ML/M2
BH CV ECHO MEAS - SVI(MOD-SP2): 51.5 ML/M2
BH CV ECHO MEAS - SVI(MOD-SP4): 63.6 ML/M2
BH CV ECHO MEAS - TAPSE (>1.6): 3 CM
BH CV ECHO MEAS - TR MAX PG: 29.4 MMHG
BH CV ECHO MEAS - TR MAX VEL: 271 CM/SEC
BH CV ECHO MEASUREMENTS AVERAGE E/E' RATIO: 17.53
LEFT ATRIUM VOLUME INDEX: 76.4 ML/M2
LV EF BIPLANE MOD: 62.7 %

## 2025-05-21 ENCOUNTER — OFFICE VISIT (OUTPATIENT)
Dept: SURGERY | Facility: CLINIC | Age: 65
End: 2025-05-21
Payer: COMMERCIAL

## 2025-05-21 VITALS — HEIGHT: 60 IN | BODY MASS INDEX: 35.34 KG/M2 | WEIGHT: 180 LBS

## 2025-05-21 DIAGNOSIS — R10.13 EPIGASTRIC PAIN: Primary | ICD-10-CM

## 2025-05-21 PROCEDURE — 1160F RVW MEDS BY RX/DR IN RCRD: CPT | Performed by: SURGERY

## 2025-05-21 PROCEDURE — 99213 OFFICE O/P EST LOW 20 MIN: CPT | Performed by: SURGERY

## 2025-05-21 PROCEDURE — 1159F MED LIST DOCD IN RCRD: CPT | Performed by: SURGERY

## 2025-05-21 NOTE — PROGRESS NOTES
Subjective   Bia Mott is a 64 y.o. female  is here today for follow-up.         Bia Mott is a 64 y.o. female here for follow-up after esophagram.  Esophagram shows sliding hiatal hernia with mild reflux and tertiary contractions of the distal esophagus.  Her symptoms are controlled with twice daily PPI therapy.  No previous EGD reported.  History of Present Illness    Review of systems: 10 point review of systems performed and negative apart from belching    Physical Exam  Abdomen soft nontender nondistended  Physical Exam              Assessment     Diagnoses and all orders for this visit:    1. Epigastric pain (Primary)  -     Case Request; Standing  -     Case Request    Other orders  -     Follow Anesthesia Guidelines / Protocol; Future  -     Follow Anesthesia Guidelines / Protocol; Standing  -     Verify / Perform Chlorhexidine Skin Prep; Standing  -     Provide NPO Instructions to Patient; Future  -     Chlorhexidine Skin Prep; Future  -     Place Sequential Compression Device; Standing  -     Maintain Sequential Compression Device; Standing      Bia Mott is a 64 y.o. female with GERD and sliding hiatal hernia.  Symptoms are controlled with twice daily PPI therapy.  She will undergo EGD.  Assessment & Plan          This document has been electronically signed by Troy Melchor MD   May 21, 2025 15:50 EDT

## 2025-08-06 ENCOUNTER — HOSPITAL ENCOUNTER (OUTPATIENT)
Dept: MAMMOGRAPHY | Facility: HOSPITAL | Age: 65
Discharge: HOME | End: 2025-08-06
Payer: COMMERCIAL

## 2025-08-06 ENCOUNTER — HOSPITAL ENCOUNTER (OUTPATIENT)
Dept: ULTRASOUND IMAGING | Facility: HOSPITAL | Age: 65
Discharge: HOME | End: 2025-08-06
Payer: COMMERCIAL

## 2025-08-06 DIAGNOSIS — N63.10 MASS OF RIGHT BREAST, UNSPECIFIED QUADRANT: ICD-10-CM

## 2025-08-06 PROCEDURE — 77065 DX MAMMO INCL CAD UNI: CPT

## 2025-08-06 PROCEDURE — G0279 TOMOSYNTHESIS, MAMMO: HCPCS

## 2025-08-06 PROCEDURE — 76642 ULTRASOUND BREAST LIMITED: CPT

## 2025-08-08 ENCOUNTER — TRANSCRIBE ORDERS (OUTPATIENT)
Dept: ADMINISTRATIVE | Facility: HOSPITAL | Age: 65
End: 2025-08-08
Payer: COMMERCIAL

## 2025-08-08 DIAGNOSIS — D50.9 IRON DEFICIENCY ANEMIA, UNSPECIFIED IRON DEFICIENCY ANEMIA TYPE: Primary | ICD-10-CM

## 2025-08-11 ENCOUNTER — HOSPITAL ENCOUNTER (OUTPATIENT)
Dept: ULTRASOUND IMAGING | Facility: HOSPITAL | Age: 65
Discharge: HOME | End: 2025-08-11
Payer: COMMERCIAL

## 2025-08-11 ENCOUNTER — HOSPITAL ENCOUNTER (OUTPATIENT)
Dept: MAMMOGRAPHY | Facility: HOSPITAL | Age: 65
Discharge: HOME | End: 2025-08-11
Payer: COMMERCIAL

## 2025-08-11 DIAGNOSIS — R92.8 ABNORMAL MAMMOGRAM: ICD-10-CM

## 2025-08-11 PROCEDURE — A4648 IMPLANTABLE TISSUE MARKER: HCPCS

## 2025-08-14 ENCOUNTER — TELEPHONE (OUTPATIENT)
Dept: ONCOLOGY | Facility: CLINIC | Age: 65
End: 2025-08-14
Payer: COMMERCIAL

## 2025-08-14 LAB — REF LAB TEST METHOD: NORMAL

## 2025-08-15 ENCOUNTER — OFFICE VISIT (OUTPATIENT)
Dept: SURGERY | Facility: CLINIC | Age: 65
End: 2025-08-15
Payer: COMMERCIAL

## 2025-08-15 ENCOUNTER — CONSULT (OUTPATIENT)
Dept: ONCOLOGY | Facility: CLINIC | Age: 65
End: 2025-08-15
Payer: COMMERCIAL

## 2025-08-15 ENCOUNTER — PATIENT OUTREACH (OUTPATIENT)
Dept: ONCOLOGY | Facility: CLINIC | Age: 65
End: 2025-08-15
Payer: COMMERCIAL

## 2025-08-15 ENCOUNTER — LAB (OUTPATIENT)
Dept: ONCOLOGY | Facility: CLINIC | Age: 65
End: 2025-08-15
Payer: COMMERCIAL

## 2025-08-15 VITALS
SYSTOLIC BLOOD PRESSURE: 135 MMHG | DIASTOLIC BLOOD PRESSURE: 76 MMHG | RESPIRATION RATE: 20 BRPM | HEART RATE: 110 BPM | HEIGHT: 60 IN | BODY MASS INDEX: 36.71 KG/M2 | OXYGEN SATURATION: 98 % | TEMPERATURE: 98 F | WEIGHT: 187 LBS

## 2025-08-15 VITALS
HEART RATE: 88 BPM | SYSTOLIC BLOOD PRESSURE: 142 MMHG | DIASTOLIC BLOOD PRESSURE: 76 MMHG | HEIGHT: 60 IN | BODY MASS INDEX: 36.71 KG/M2 | WEIGHT: 187 LBS

## 2025-08-15 DIAGNOSIS — C50.211 MALIGNANT NEOPLASM OF UPPER-INNER QUADRANT OF RIGHT BREAST IN FEMALE, ESTROGEN RECEPTOR NEGATIVE: Primary | ICD-10-CM

## 2025-08-15 DIAGNOSIS — Z17.1 MALIGNANT NEOPLASM OF UPPER-INNER QUADRANT OF RIGHT BREAST IN FEMALE, ESTROGEN RECEPTOR NEGATIVE: Primary | ICD-10-CM

## 2025-08-15 RX ORDER — BUSPIRONE HYDROCHLORIDE 5 MG/1
TABLET ORAL
COMMUNITY
Start: 2025-07-27

## 2025-08-15 RX ORDER — METOPROLOL SUCCINATE 25 MG/1
12.5 TABLET, EXTENDED RELEASE ORAL DAILY
COMMUNITY
Start: 2025-06-12

## 2025-08-15 RX ORDER — FERROUS SULFATE 325(65) MG
325 TABLET ORAL DAILY
COMMUNITY
Start: 2025-04-26

## 2025-08-15 RX ORDER — LISINOPRIL 20 MG/1
20 TABLET ORAL DAILY
COMMUNITY
Start: 2025-05-15

## 2025-08-15 RX ORDER — ROSUVASTATIN CALCIUM 20 MG/1
20 TABLET, COATED ORAL DAILY
COMMUNITY
Start: 2025-04-26

## 2025-08-15 RX ORDER — IBUPROFEN 800 MG/1
800 TABLET, FILM COATED ORAL EVERY 6 HOURS PRN
COMMUNITY
Start: 2025-05-15 | End: 2025-08-16 | Stop reason: SDUPTHER

## 2025-08-16 ENCOUNTER — LAB (OUTPATIENT)
Dept: LAB | Facility: HOSPITAL | Age: 65
End: 2025-08-16
Payer: COMMERCIAL

## 2025-08-16 DIAGNOSIS — Z17.1 MALIGNANT NEOPLASM OF UPPER-INNER QUADRANT OF RIGHT BREAST IN FEMALE, ESTROGEN RECEPTOR NEGATIVE: ICD-10-CM

## 2025-08-16 DIAGNOSIS — C50.211 MALIGNANT NEOPLASM OF UPPER-INNER QUADRANT OF RIGHT BREAST IN FEMALE, ESTROGEN RECEPTOR NEGATIVE: ICD-10-CM

## 2025-08-16 LAB
ALBUMIN SERPL-MCNC: 4.5 G/DL (ref 3.5–5.2)
ALBUMIN/GLOB SERPL: 2 G/DL
ALP SERPL-CCNC: 91 U/L (ref 39–117)
ALT SERPL W P-5'-P-CCNC: 19 U/L (ref 1–33)
ANION GAP SERPL CALCULATED.3IONS-SCNC: 10.8 MMOL/L (ref 5–15)
AST SERPL-CCNC: 24 U/L (ref 1–32)
BASOPHILS # BLD AUTO: 0.04 10*3/MM3 (ref 0–0.2)
BASOPHILS NFR BLD AUTO: 0.9 % (ref 0–1.5)
BILIRUB SERPL-MCNC: 0.5 MG/DL (ref 0–1.2)
BUN SERPL-MCNC: 20.1 MG/DL (ref 8–23)
BUN/CREAT SERPL: 26.4 (ref 7–25)
CALCIUM SPEC-SCNC: 9.3 MG/DL (ref 8.6–10.5)
CANCER AG15-3 SERPL-ACNC: 8 U/ML
CHLORIDE SERPL-SCNC: 103 MMOL/L (ref 98–107)
CO2 SERPL-SCNC: 23.2 MMOL/L (ref 22–29)
CREAT SERPL-MCNC: 0.76 MG/DL (ref 0.57–1)
DEPRECATED RDW RBC AUTO: 56.3 FL (ref 37–54)
EGFRCR SERPLBLD CKD-EPI 2021: 88.2 ML/MIN/1.73
EOSINOPHIL # BLD AUTO: 0.14 10*3/MM3 (ref 0–0.4)
EOSINOPHIL NFR BLD AUTO: 3.3 % (ref 0.3–6.2)
ERYTHROCYTE [DISTWIDTH] IN BLOOD BY AUTOMATED COUNT: 20.6 % (ref 12.3–15.4)
GLOBULIN UR ELPH-MCNC: 2.3 GM/DL
GLUCOSE SERPL-MCNC: 120 MG/DL (ref 65–99)
HCT VFR BLD AUTO: 36 % (ref 34–46.6)
HGB BLD-MCNC: 11 G/DL (ref 12–15.9)
IMM GRANULOCYTES # BLD AUTO: 0.01 10*3/MM3 (ref 0–0.05)
IMM GRANULOCYTES NFR BLD AUTO: 0.2 % (ref 0–0.5)
LYMPHOCYTES # BLD AUTO: 1.2 10*3/MM3 (ref 0.7–3.1)
LYMPHOCYTES NFR BLD AUTO: 28.2 % (ref 19.6–45.3)
MCH RBC QN AUTO: 23.3 PG (ref 26.6–33)
MCHC RBC AUTO-ENTMCNC: 30.6 G/DL (ref 31.5–35.7)
MCV RBC AUTO: 76.1 FL (ref 79–97)
MONOCYTES # BLD AUTO: 0.29 10*3/MM3 (ref 0.1–0.9)
MONOCYTES NFR BLD AUTO: 6.8 % (ref 5–12)
NEUTROPHILS NFR BLD AUTO: 2.58 10*3/MM3 (ref 1.7–7)
NEUTROPHILS NFR BLD AUTO: 60.6 % (ref 42.7–76)
NRBC BLD AUTO-RTO: 0 /100 WBC (ref 0–0.2)
PLATELET # BLD AUTO: 231 10*3/MM3 (ref 140–450)
PMV BLD AUTO: 10.2 FL (ref 6–12)
POTASSIUM SERPL-SCNC: 4.8 MMOL/L (ref 3.5–5.2)
PROT SERPL-MCNC: 6.8 G/DL (ref 6–8.5)
RBC # BLD AUTO: 4.73 10*6/MM3 (ref 3.77–5.28)
SODIUM SERPL-SCNC: 137 MMOL/L (ref 136–145)
WBC NRBC COR # BLD AUTO: 4.26 10*3/MM3 (ref 3.4–10.8)

## 2025-08-16 PROCEDURE — 36415 COLL VENOUS BLD VENIPUNCTURE: CPT

## 2025-08-16 PROCEDURE — 85025 COMPLETE CBC W/AUTO DIFF WBC: CPT

## 2025-08-16 PROCEDURE — 80053 COMPREHEN METABOLIC PANEL: CPT

## 2025-08-16 PROCEDURE — 86300 IMMUNOASSAY TUMOR CA 15-3: CPT

## 2025-08-16 RX ORDER — IBUPROFEN 800 MG/1
800 TABLET, FILM COATED ORAL EVERY 6 HOURS PRN
Qty: 60 TABLET | Refills: 3 | Status: SHIPPED | OUTPATIENT
Start: 2025-08-16

## 2025-08-18 ENCOUNTER — RESULTS FOLLOW-UP (OUTPATIENT)
Dept: LAB | Facility: HOSPITAL | Age: 65
End: 2025-08-18
Payer: COMMERCIAL

## 2025-08-18 DIAGNOSIS — C50.211 MALIGNANT NEOPLASM OF UPPER-INNER QUADRANT OF RIGHT BREAST IN FEMALE, ESTROGEN RECEPTOR NEGATIVE: Primary | ICD-10-CM

## 2025-08-18 DIAGNOSIS — Z17.1 MALIGNANT NEOPLASM OF UPPER-INNER QUADRANT OF RIGHT BREAST IN FEMALE, ESTROGEN RECEPTOR NEGATIVE: Primary | ICD-10-CM

## 2025-08-19 ENCOUNTER — TELEPHONE (OUTPATIENT)
Dept: SURGERY | Facility: CLINIC | Age: 65
End: 2025-08-19
Payer: COMMERCIAL

## 2025-08-19 DIAGNOSIS — Z17.1 MALIGNANT NEOPLASM OF UPPER-INNER QUADRANT OF RIGHT BREAST IN FEMALE, ESTROGEN RECEPTOR NEGATIVE: Primary | ICD-10-CM

## 2025-08-19 DIAGNOSIS — C50.211 MALIGNANT NEOPLASM OF UPPER-INNER QUADRANT OF RIGHT BREAST IN FEMALE, ESTROGEN RECEPTOR NEGATIVE: Primary | ICD-10-CM

## 2025-08-19 LAB — CANCER AG27-29 SERPL-ACNC: 14.7 U/ML (ref 0–38.6)

## 2025-08-20 ENCOUNTER — HOSPITAL ENCOUNTER (OUTPATIENT)
Facility: HOSPITAL | Age: 65
Setting detail: HOSPITAL OUTPATIENT SURGERY
Discharge: HOME | End: 2025-08-20
Attending: SURGERY | Admitting: SURGERY
Payer: COMMERCIAL

## 2025-08-20 ENCOUNTER — ANESTHESIA EVENT (OUTPATIENT)
Dept: PERIOP | Facility: HOSPITAL | Age: 65
End: 2025-08-20
Payer: COMMERCIAL

## 2025-08-20 ENCOUNTER — ANESTHESIA (OUTPATIENT)
Dept: PERIOP | Facility: HOSPITAL | Age: 65
End: 2025-08-20
Payer: COMMERCIAL

## 2025-08-20 ENCOUNTER — APPOINTMENT (OUTPATIENT)
Dept: GENERAL RADIOLOGY | Facility: HOSPITAL | Age: 65
End: 2025-08-20
Payer: COMMERCIAL

## 2025-08-20 PROCEDURE — 25010000002 LIDOCAINE PF 2% 2 % SOLUTION: Performed by: NURSE ANESTHETIST, CERTIFIED REGISTERED

## 2025-08-20 PROCEDURE — 25010000002 ONDANSETRON PER 1 MG: Performed by: NURSE ANESTHETIST, CERTIFIED REGISTERED

## 2025-08-20 PROCEDURE — 25810000003 LACTATED RINGERS PER 1000 ML: Performed by: ANESTHESIOLOGY

## 2025-08-20 PROCEDURE — 25010000002 PROPOFOL 200 MG/20ML EMULSION: Performed by: NURSE ANESTHETIST, CERTIFIED REGISTERED

## 2025-08-20 PROCEDURE — 25010000002 FENTANYL CITRATE (PF) 50 MCG/ML SOLUTION: Performed by: NURSE ANESTHETIST, CERTIFIED REGISTERED

## 2025-08-20 PROCEDURE — 25010000002 CEFAZOLIN PER 500 MG: Performed by: SURGERY

## 2025-08-20 PROCEDURE — 25010000002 FAMOTIDINE 10 MG/ML SOLUTION: Performed by: NURSE ANESTHETIST, CERTIFIED REGISTERED

## 2025-08-20 RX ORDER — EPHEDRINE SULFATE/0.9% NACL/PF 25 MG/5 ML
SYRINGE (ML) INTRAVENOUS AS NEEDED
Status: DISCONTINUED | OUTPATIENT
Start: 2025-08-20 | End: 2025-08-20 | Stop reason: SURG

## 2025-08-20 RX ORDER — ONDANSETRON 2 MG/ML
INJECTION INTRAMUSCULAR; INTRAVENOUS AS NEEDED
Status: DISCONTINUED | OUTPATIENT
Start: 2025-08-20 | End: 2025-08-20 | Stop reason: SURG

## 2025-08-20 RX ORDER — LIDOCAINE HYDROCHLORIDE 20 MG/ML
INJECTION, SOLUTION EPIDURAL; INFILTRATION; INTRACAUDAL; PERINEURAL AS NEEDED
Status: DISCONTINUED | OUTPATIENT
Start: 2025-08-20 | End: 2025-08-20 | Stop reason: SURG

## 2025-08-20 RX ORDER — PROPOFOL 10 MG/ML
INJECTION, EMULSION INTRAVENOUS AS NEEDED
Status: DISCONTINUED | OUTPATIENT
Start: 2025-08-20 | End: 2025-08-20 | Stop reason: SURG

## 2025-08-20 RX ORDER — FAMOTIDINE 10 MG/ML
INJECTION, SOLUTION INTRAVENOUS AS NEEDED
Status: DISCONTINUED | OUTPATIENT
Start: 2025-08-20 | End: 2025-08-20 | Stop reason: SURG

## 2025-08-20 RX ORDER — PHENYLEPHRINE HCL IN 0.9% NACL 1 MG/10 ML
SYRINGE (ML) INTRAVENOUS AS NEEDED
Status: DISCONTINUED | OUTPATIENT
Start: 2025-08-20 | End: 2025-08-20 | Stop reason: SURG

## 2025-08-20 RX ORDER — FENTANYL CITRATE 50 UG/ML
INJECTION, SOLUTION INTRAMUSCULAR; INTRAVENOUS AS NEEDED
Status: DISCONTINUED | OUTPATIENT
Start: 2025-08-20 | End: 2025-08-20 | Stop reason: SURG

## 2025-08-20 RX ADMIN — FAMOTIDINE 20 MG: 10 INJECTION INTRAVENOUS at 07:43

## 2025-08-20 RX ADMIN — SODIUM CHLORIDE, POTASSIUM CHLORIDE, SODIUM LACTATE AND CALCIUM CHLORIDE: 600; 310; 30; 20 INJECTION, SOLUTION INTRAVENOUS at 07:43

## 2025-08-20 RX ADMIN — Medication 100 MCG: at 07:54

## 2025-08-20 RX ADMIN — Medication 200 MCG: at 07:56

## 2025-08-20 RX ADMIN — CEFAZOLIN 2000 MG: 2 INJECTION, POWDER, FOR SOLUTION INTRAMUSCULAR; INTRAVENOUS at 07:46

## 2025-08-20 RX ADMIN — EPHEDRINE SULFATE 10 MG: 5 INJECTION INTRAVENOUS at 08:09

## 2025-08-20 RX ADMIN — FENTANYL CITRATE 50 MCG: 50 INJECTION, SOLUTION INTRAMUSCULAR; INTRAVENOUS at 07:43

## 2025-08-20 RX ADMIN — EPHEDRINE SULFATE 10 MG: 5 INJECTION INTRAVENOUS at 08:00

## 2025-08-20 RX ADMIN — PROPOFOL 150 MG: 10 INJECTION, EMULSION INTRAVENOUS at 07:45

## 2025-08-20 RX ADMIN — Medication 200 MCG: at 08:23

## 2025-08-20 RX ADMIN — FENTANYL CITRATE 50 MCG: 50 INJECTION, SOLUTION INTRAMUSCULAR; INTRAVENOUS at 08:01

## 2025-08-20 RX ADMIN — ONDANSETRON 4 MG: 2 INJECTION, SOLUTION INTRAMUSCULAR; INTRAVENOUS at 07:43

## 2025-08-20 RX ADMIN — EPHEDRINE SULFATE 5 MG: 5 INJECTION INTRAVENOUS at 08:19

## 2025-08-20 RX ADMIN — LIDOCAINE HYDROCHLORIDE 100 MG: 20 INJECTION, SOLUTION EPIDURAL; INFILTRATION; INTRACAUDAL; PERINEURAL at 07:45

## 2025-08-20 RX ADMIN — PROPOFOL 20 MG: 10 INJECTION, EMULSION INTRAVENOUS at 08:23

## 2025-08-20 RX ADMIN — PROPOFOL 20 MG: 10 INJECTION, EMULSION INTRAVENOUS at 08:01

## 2025-08-26 ENCOUNTER — HOSPITAL ENCOUNTER (OUTPATIENT)
Dept: CARDIOLOGY | Facility: HOSPITAL | Age: 65
Discharge: HOME | End: 2025-08-26
Admitting: INTERNAL MEDICINE
Payer: COMMERCIAL

## 2025-08-26 DIAGNOSIS — Z17.1 MALIGNANT NEOPLASM OF UPPER-INNER QUADRANT OF RIGHT BREAST IN FEMALE, ESTROGEN RECEPTOR NEGATIVE: ICD-10-CM

## 2025-08-26 DIAGNOSIS — C50.211 MALIGNANT NEOPLASM OF UPPER-INNER QUADRANT OF RIGHT BREAST IN FEMALE, ESTROGEN RECEPTOR NEGATIVE: ICD-10-CM

## 2025-08-26 PROCEDURE — 93306 TTE W/DOPPLER COMPLETE: CPT

## (undated) DEVICE — ENCORE® LATEX MICRO SIZE 7.5, STERILE LATEX POWDER-FREE SURGICAL GLOVE: Brand: ENCORE

## (undated) DEVICE — Device

## (undated) DEVICE — UNDERCAST PADDING: Brand: DEROYAL

## (undated) DEVICE — DRSNG WND GZ CURAD OIL EMULSION 3X8IN LF STRL 1PK

## (undated) DEVICE — PK EXTREM UPPR 70

## (undated) DEVICE — ANTIBACTERIAL UNDYED BRAIDED (POLYGLACTIN 910), SYNTHETIC ABSORBABLE SUTURE: Brand: COATED VICRYL

## (undated) DEVICE — SYS CLS SKIN PREMIERPRO EXOFINFUSION 22CM

## (undated) DEVICE — STERILE PVP: Brand: MEDLINE INDUSTRIES, INC.

## (undated) DEVICE — BNDG ELAS CO-FLEX SLF ADHR 4IN5YD LF STRL

## (undated) DEVICE — PK KN TOTL 10

## (undated) DEVICE — TRAP FLD MINIVAC MEGADYNE 100ML

## (undated) DEVICE — SUT ETHLN 3/0 FS1 663G

## (undated) DEVICE — DRP C/ARM W/BAND W/CLIPS 41X74IN

## (undated) DEVICE — DISPOSABLE TOURNIQUET CUFF SINGLE BLADDER, SINGLE PORT AND LUER LOCK CONNECTOR: Brand: COLOR CUFF

## (undated) DEVICE — GLV SURG PREMIERPRO MIC LTX PF SZ8 BRN

## (undated) DEVICE — TP UMB COTN 1/8X36 U12T

## (undated) DEVICE — TRY EPID SFTY 18G 3.5IN 1T7680

## (undated) DEVICE — PAD ARMBRD SURG CONVOL 7.5X20X2IN

## (undated) DEVICE — BNDG ELAS W/CLIP 6IN 10YD LF STRL

## (undated) DEVICE — STRYKER PERFORMANCE SERIES SAGITTAL BLADE: Brand: STRYKER PERFORMANCE SERIES

## (undated) DEVICE — 3 BONE CEMENT MIXER: Brand: MIXEVAC

## (undated) DEVICE — ARM SLING: Brand: DEROYAL

## (undated) DEVICE — HOLDER: Brand: DEROYAL

## (undated) DEVICE — DRSNG PAD ABD 8X10IN STRL

## (undated) DEVICE — SPNG GZ STRL 2S 4X4 12PLY

## (undated) DEVICE — PIN DRL NOHEAD TROC 3.2X75MM

## (undated) DEVICE — BNDG ELAS CO-FLEX SLF ADHR 3IN5YD LF2 STRL

## (undated) DEVICE — BNDG ELAS CO-FLEX SLF ADHR 2IN 5YD LF STRL

## (undated) DEVICE — 450 ML BOTTLE OF 0.05% CHLORHEXIDINE GLUCONATE IN 99.95% STERILE WATER FOR IRRIGATION, USP AND APPLICATOR.: Brand: IRRISEPT ANTIMICROBIAL WOUND LAVAGE

## (undated) DEVICE — BIT DRL QC DIA W/DEPTHMARK 1.8X110MM

## (undated) DEVICE — IMPLANTABLE DEVICE
Type: IMPLANTABLE DEVICE | Site: KNEE | Status: NON-FUNCTIONAL
Brand: PERSONA™
Removed: 2022-09-07

## (undated) DEVICE — SUT VIC 3/0 SH 27IN J416H

## (undated) DEVICE — PATIENT RETURN ELECTRODE, SINGLE-USE, CONTACT QUALITY MONITORING, ADULT, WITH 9FT CORD, FOR PATIENTS WEIGING OVER 33LBS. (15KG): Brand: MEGADYNE

## (undated) DEVICE — SUT MONOCRYL PLS ANTIB UND 3/0  PS1 27IN

## (undated) DEVICE — SYR LUERLOK 30CC

## (undated) DEVICE — TBG PENCL TELESCP MEGADYNE SMOKE EVAC 10FT

## (undated) DEVICE — KT PUMP INFUBLOCK MDL 2100 PMKITSOLIS

## (undated) DEVICE — BLANKT WARM UPPR/BDY ARM/OUT 57X196CM